# Patient Record
Sex: FEMALE | Race: BLACK OR AFRICAN AMERICAN | Employment: FULL TIME | URBAN - METROPOLITAN AREA
[De-identification: names, ages, dates, MRNs, and addresses within clinical notes are randomized per-mention and may not be internally consistent; named-entity substitution may affect disease eponyms.]

---

## 2017-02-20 ENCOUNTER — HOSPITAL ENCOUNTER (EMERGENCY)
Age: 32
Discharge: HOME | End: 2017-02-20
Attending: EMERGENCY MEDICINE
Payer: COMMERCIAL

## 2017-02-20 VITALS
BODY MASS INDEX: 22.26 KG/M2 | SYSTOLIC BLOOD PRESSURE: 111 MMHG | TEMPERATURE: 98.7 F | WEIGHT: 121 LBS | HEIGHT: 62 IN | RESPIRATION RATE: 16 BRPM | DIASTOLIC BLOOD PRESSURE: 74 MMHG | OXYGEN SATURATION: 98 % | HEART RATE: 87 BPM

## 2017-02-20 DIAGNOSIS — M79.621 AXILLARY PAIN, RIGHT: Primary | ICD-10-CM

## 2017-02-20 PROCEDURE — 93971 EXTREMITY STUDY: CPT

## 2017-02-20 PROCEDURE — 99281 EMR DPT VST MAYX REQ PHY/QHP: CPT

## 2017-02-20 NOTE — ED TRIAGE NOTES
TRIAGE NOTE: Pt arrives from [facility] who advised her to come to ED to rule out DVT to her right armpit. Pt reports noticing a bump on Wednesday and it has progressed to two bumps. Reports MD at [facility] was pretty sure that it is a superficial blood clot but wanted her to have an US to be sure.

## 2017-02-21 NOTE — DISCHARGE INSTRUCTIONS
We hope that we have addressed all of your medical concerns. The examination and treatment you received in the Emergency Department were for an emergent problem and were not intended as complete care. It is important that you follow up with your healthcare provider(s) for ongoing care. If your symptoms worsen or do not improve as expected, and you are unable to reach your usual health care provider(s), you should return to the Emergency Department. Today's healthcare is undergoing tremendous change, and patient satisfaction surveys are one of the many tools to assess the quality of medical care. You may receive a survey from the [employer] regarding your experience in the Emergency Department. I hope that your experience has been completely positive, particularly the medical care that I provided. As such, please participate in the survey; anything less than excellent does not meet my expectations or intentions. Thank you for allowing us to provide you with medical care today. We realize that you have many choices for your emergency care needs. Please choose us in the future for any continued health care needs. Raymundo Fay [facility], [address]: [phone]            No results found for this or any previous visit (from the past 24 hour(s)). No results found.

## 2017-02-21 NOTE — ED PROVIDER NOTES
HPI Comments: 28-year-old female presents to the emergency department for evaluation of pain under her right axilla. Patient noted a bump in the armpit on Thursday,emergency 4 days ago. She does not have any pain. She hheheas not had any drainage. She has not had fever or chills. Denies any injury or trauma. No chest pain or shortness of breath. No difficulty breathing. No medicines taken for this. No medicines taken prior to arrival. No aggravating factors. The patient was seen at urgent care and referred to the emergency room for further evaluation and ultrasound. Social hx  +smoker  Social alcohol    The history is provided by the patient. Past Medical History:   Diagnosis Date    Anxiety        Past Surgical History:   Procedure Laterality Date    Hx other surgical       leep surgery, ectopic pregnancy         History reviewed. No pertinent family history. Social History     Social History    Marital status: SINGLE     Spouse name: N/A    Number of children: N/A    Years of education: N/A     Occupational History    Not on file. Social History Main Topics    Smoking status: Current Every Day Smoker     Types: Cigarettes    Smokeless tobacco: Never Used      Comment: began smoking again    Alcohol use 0.0 oz/week     0 Standard drinks or equivalent per week      Comment: Social     Drug use: No    Sexual activity: Not on file     Other Topics Concern    Not on file     Social History Narrative         ALLERGIES: Biaxin [clarithromycin] and Pepto-bismol [bismuth subsalicylate]    Review of Systems   Constitutional: Negative for chills and fever. HENT: Negative for congestion and rhinorrhea. Respiratory: Negative for cough, chest tightness and shortness of breath. Cardiovascular: Negative for chest pain. Gastrointestinal: Negative for abdominal pain, nausea and vomiting. Musculoskeletal: Negative for back pain, neck pain and neck stiffness.    Skin: Negative for color change and rash. Neurological: Negative for dizziness, weakness, light-headedness and headaches. All other systems reviewed and are negative. Vitals:    02/20/17 1731   BP: 110/75   Pulse: 97   Resp: 17   Temp: 98.7 °F (37.1 °C)   SpO2: 97%   Weight: 54.9 kg (121 lb)   Height: 5' 2\" (1.575 m)            Physical Exam   Constitutional: She is oriented to person, place, and time. She appears well-developed and well-nourished. No distress. HENT:   Head: Normocephalic and atraumatic. Right Ear: External ear normal.   Left Ear: External ear normal.   Eyes: Conjunctivae and EOM are normal. Pupils are equal, round, and reactive to light. Neck: Normal range of motion. Neck supple. Cardiovascular: Normal rate and regular rhythm. Pulmonary/Chest: Effort normal and breath sounds normal. No respiratory distress. Musculoskeletal: Normal range of motion. Right axilla. No redness, warmth or swelling. Palpable cord to right axilla. No induration or fluctuance to suggest abscess. No pain with palpation. Neurological: She is alert and oriented to person, place, and time. She exhibits normal muscle tone. Coordination normal.   Skin: Skin is warm and dry. No erythema. Psychiatric: She has a normal mood and affect. Her behavior is normal. Judgment and thought content normal.   Nursing note and vitals reviewed. MDM  Number of Diagnoses or Management Options  Axillary pain, right:   Diagnosis management comments: 61-year-old female presenting to the emergency room for right. Present for 4 days. No pain with palpation. No induration or fluctuance to suggest abscess. There is a palpable cord. Nontender to palpation  Plan: Vascular study    1:02 AM  Study negative for dvt. No signs of abscess or infection at this time. No fever. Will discharge home with warm compresses and pcp followup. Patient's results have been reviewed with them.   Patient and/or family have verbally conveyed their understanding and agreement of the patient's signs, symptoms, diagnosis, treatment and prognosis and additionally agree to follow up as recommended or return to the Emergency Room should their condition change prior to follow-up. Discharge instructions have also been provided to the patient with some educational information regarding their diagnosis as well a list of reasons why they would want to return to the ER prior to their follow-up appointment should their condition change. Amount and/or Complexity of Data Reviewed  Discuss the patient with other providers: yes (ER attending-Alex)    Patient Progress  Patient progress: stable    ED Course       Procedures    Pt case including HPI, PE, and all available lab and radiology results has been discussed with attending physician. Opportunity to evaluate patient has been provided to ER attending. Discharge and prescription plan has been agreed upon.

## 2017-02-21 NOTE — PROCEDURES
[facility]  *** FINAL REPORT ***    Name: David Heredia  MRN: [id number]  : 1985  HIS Order #: [id number]  [id number] [facility] Visit #: [id number]  Date: 2017    TYPE OF TEST: Peripheral Venous Testing    REASON FOR TEST  Pain in limb    Right Arm:-  Deep venous thrombosis:           No  Superficial venous thrombosis:    No      INTERPRETATION/FINDINGS  PROCEDURE:  Color duplex ultrasound imaging of upper extremity veins. FINDINGS:       Right:  The internal jugular, subclavian, axillary, brachial,  basilic, and cephalic veins are patent and without evidence of  thrombus where visualized;  each is compressible and there is no  narrowing of the flow channel on color Doppler imaging. Phasic/pulsatile flow is observed in the subclavian vein. Left:    The subclavian vein is patent and without evidence of  thrombus. Phasic/pulsatile flow is observed. This side was not  otherwise evaluated. IMPRESSION:  No evidence of right upper extremity vein thrombosis. ADDITIONAL COMMENTS    I have personally reviewed the data relevant to the interpretation of  this  study.     TECHNOLOGIST: CASH West, LORRIE  Signed: 2017 08:04 PM    PHYSICIAN: Wing Yeager., MD  Signed: 2017 07:52 AM

## 2017-03-28 ENCOUNTER — TELEPHONE (OUTPATIENT)
Dept: FAMILY MEDICINE CLINIC | Age: 32
End: 2017-03-28

## 2017-03-28 DIAGNOSIS — K21.9 GERD WITHOUT ESOPHAGITIS: Primary | ICD-10-CM

## 2017-03-28 NOTE — TELEPHONE ENCOUNTER
Patient informed me that she has been experiencing acid coming up in throat and sensation of choking. Patient states \" I have ceased smoking, changed my diet, taken Tums with no relief and have sensation of burping but unable to. \"  Patient inquiring for referral to gastro.

## 2017-03-28 NOTE — TELEPHONE ENCOUNTER
Patient called and said she was still having issues with her throat. She would like to know if Dr. Karyl Spurling would refer her to a specialist. She said she would like to see someone maybe for acid reflux etc. Please advise, her contact number is [phone].  Thanks

## 2017-03-29 NOTE — TELEPHONE ENCOUNTER
Patient was given name of gastro doctor Dr. Frank Ramirez and office number, informed to contact our office with appointment date and time for insurance approval .    Patient was sent information of move and informed to contact [facility] after March 31, 2017. Patient verbalized understanding.

## 2017-04-05 ENCOUNTER — TELEPHONE (OUTPATIENT)
Dept: FAMILY MEDICINE CLINIC | Age: 32
End: 2017-04-05

## 2017-04-05 DIAGNOSIS — K21.9 GERD WITHOUT ESOPHAGITIS: Primary | ICD-10-CM

## 2017-04-05 DIAGNOSIS — R07.0 THROAT PAIN: ICD-10-CM

## 2017-04-05 NOTE — TELEPHONE ENCOUNTER
Ben Kristen  [phone]    Patient is requesting a return call from Dr. Shala Mansfield. She stated that Dr. Shala Mansfield will know what it is regarding.

## 2017-04-05 NOTE — TELEPHONE ENCOUNTER
Pt returned call, states she is aware of appointment scheduled with Dr Marisela Lawson however she now wants a new referral to see an ENT. Pt states she has been experiencing sore throat for a long time and was told it was related to stress. Pt states that now believes sore throat is related to acid reflux and believe \"more is going on\".  She c/o feeling a \"sore, tingling sensation in her throat\" and wants further eval for a specialist.

## 2018-04-26 ENCOUNTER — HOSPITAL ENCOUNTER (OUTPATIENT)
Dept: ULTRASOUND IMAGING | Age: 33
Discharge: HOME | End: 2018-04-26
Attending: OTOLARYNGOLOGY
Payer: COMMERCIAL

## 2018-04-26 DIAGNOSIS — D34 BENIGN NEOPLASM OF THYROID GLAND: ICD-10-CM

## 2018-04-26 PROCEDURE — 76536 US EXAM OF HEAD AND NECK: CPT

## 2018-06-09 ENCOUNTER — HOSPITAL ENCOUNTER (EMERGENCY)
Age: 33
Discharge: HOME | End: 2018-06-09
Attending: EMERGENCY MEDICINE
Payer: COMMERCIAL

## 2018-06-09 VITALS
HEART RATE: 77 BPM | OXYGEN SATURATION: 100 % | HEIGHT: 62 IN | TEMPERATURE: 98.5 F | SYSTOLIC BLOOD PRESSURE: 114 MMHG | WEIGHT: 125 LBS | RESPIRATION RATE: 16 BRPM | DIASTOLIC BLOOD PRESSURE: 75 MMHG | BODY MASS INDEX: 23 KG/M2

## 2018-06-09 DIAGNOSIS — R10.2 PELVIC PAIN IN FEMALE: Primary | ICD-10-CM

## 2018-06-09 LAB
APPEARANCE UR: CLEAR
BACTERIA URNS QL MICRO: NEGATIVE /HPF
BILIRUB UR QL: NEGATIVE
COLOR UR: ABNORMAL
EPITH CASTS URNS QL MICRO: ABNORMAL /LPF
GLUCOSE UR STRIP.AUTO-MCNC: NEGATIVE MG/DL
HCG UR QL: NEGATIVE
HGB UR QL STRIP: ABNORMAL
HYALINE CASTS URNS QL MICRO: ABNORMAL /LPF (ref 0–5)
KETONES UR QL STRIP.AUTO: NEGATIVE MG/DL
LEUKOCYTE ESTERASE UR QL STRIP.AUTO: NEGATIVE
NITRITE UR QL STRIP.AUTO: NEGATIVE
PH UR STRIP: 6 [PH] (ref 5–8)
PROT UR STRIP-MCNC: NEGATIVE MG/DL
RBC #/AREA URNS HPF: ABNORMAL /HPF (ref 0–5)
SP GR UR REFRACTOMETRY: 1.03 (ref 1–1.03)
UR CULT HOLD, URHOLD: NORMAL
UROBILINOGEN UR QL STRIP.AUTO: 1 EU/DL (ref 0.2–1)
WBC URNS QL MICRO: ABNORMAL /HPF (ref 0–4)

## 2018-06-09 PROCEDURE — 99283 EMERGENCY DEPT VISIT LOW MDM: CPT

## 2018-06-09 PROCEDURE — 81025 URINE PREGNANCY TEST: CPT

## 2018-06-09 PROCEDURE — 81001 URINALYSIS AUTO W/SCOPE: CPT | Performed by: PHYSICIAN ASSISTANT

## 2018-06-09 NOTE — ED PROVIDER NOTES
HPI Comments: 34 y.o. female with past medical history significant for anxiety who presents from home with chief complaint of abdominal pain. Pt reports bilateral lower abdominal pain for the last 5 days which gradually localized into her LLQ accompanied by occasional dizziness. She denies current pain. Pt states she is concerned because she is [place] risk for ectopic pregnancy\"; she has had negative pregnancy tests w/ an ectopic pregnancy in the past. Pt notes she does not have her R fallopian tube. Her last MP began on 5/20/18 \"a couple days early\" and lasted for 4 days w/ \"light\" flow. Pt states she has had unprotected sex since then. Pt denies vaginal discharge, vaginal bleeding, and urinary sx. There are no other acute medical concerns at this time. PCP: Ever Serrano MD    Note written by Keyla Duran, as dictated by Delfina Almeida MD 5:58 PM    The history is provided by the patient. Past Medical History:   Diagnosis Date    Anxiety        Past Surgical History:   Procedure Laterality Date    HX OTHER SURGICAL      leep surgery, ectopic pregnancy         History reviewed. No pertinent family history. Social History     Social History    Marital status: SINGLE     Spouse name: N/A    Number of children: N/A    Years of education: N/A     Occupational History    Not on file. Social History Main Topics    Smoking status: Current Every Day Smoker     Types: Cigarettes    Smokeless tobacco: Never Used      Comment: began smoking again    Alcohol use 0.0 oz/week     0 Standard drinks or equivalent per week      Comment: Social     Drug use: No    Sexual activity: Not on file     Other Topics Concern    Not on file     Social History Narrative         ALLERGIES: Biaxin [clarithromycin] and Pepto-bismol [bismuth subsalicylate]    Review of Systems   Gastrointestinal: Positive for abdominal pain.    Genitourinary: Negative for dysuria, frequency, hematuria, vaginal bleeding and vaginal discharge. Neurological: Positive for dizziness. All other systems reviewed and are negative. Vitals:    06/09/18 1645   BP: 123/87   Pulse: (!) 103   Resp: 16   Temp: 98.7 °F (37.1 °C)   SpO2: 99%   Weight: 56.7 kg (125 lb)   Height: 5' 2\" (1.575 m)            Physical Exam   Constitutional: She appears well-developed and well-nourished. No distress. HENT:   Head: Normocephalic and atraumatic. Eyes: Conjunctivae are normal.   Neck: Neck supple. Cardiovascular: Normal rate, regular rhythm and normal heart sounds. Pulmonary/Chest: Effort normal and breath sounds normal. No stridor. No respiratory distress. Abdominal: She exhibits no distension. There is tenderness (LLQ, mild). Musculoskeletal: Normal range of motion. She exhibits no edema. Neurological: She is alert. Coordination normal.   Skin: Skin is warm and dry. Psychiatric: She has a normal mood and affect. Note written by Keyla Negro, as dictated by Gisella Brink MD 5:58 PM    MDM    61-year-old female with history of ectopic pregnancy presents with left lower pelvic pain starting over the last few days. She is concerned this could be an early ectopic pregnancy. We discussed her recent menstrual cycles and how it would be too early to detect a pregnancy given that she may have just ovulated. She is well-appearing, has a negative pregnancy test here, and there is no further workup indicated from the emergency department. Routine OB/GYN followup.     ED Course       Procedures

## 2018-06-09 NOTE — ED NOTES
4:42 PM  I have evaluated the patient as the Provider in Triage. I have reviewed Her vital signs and the triage nurse assessment. I have talked with the patient and any available family and advised that I am the provider in triage and have ordered the appropriate study to initiate their work up based on the clinical presentation during my assessment. I have advised that the patient will be accommodated in the Main ED as soon as possible. I have also requested to contact the triage nurse or myself immediately if the patient experiences any changes in their condition during this brief waiting period. Pelvic cramping. Hx ectopic. Took 2 tests at home - 1 faintly positive, one negative. Denies bleeding or discharge. No N/V/D - \"kind of constipated\". LMP May 20th.       AIDA Hannah

## 2018-06-09 NOTE — ED TRIAGE NOTES
TRIAGE NOTE: Patient reports 5 days of lower quadrant cramping and today pain seems to have moved to left. Denies vaginal discharge/bleeding. Denies urinary symptoms. Patient reports taking 2 pregnancy tests, unsure if 1st was positive. Hx of ectopic pregnancy.

## 2018-06-09 NOTE — ED NOTES
MD reviewed discharge instructions and options with patient and patient verbalized understanding. RN reviewed discharge instructions using teachback method. Pt ambulated to exit without difficulty and in no signs of acute distress escorted by self who will drive home. No complaints or needs expressed at this time. VSS at time of discharge. Pt to call PCP in the morning for follow up.

## 2018-06-09 NOTE — DISCHARGE INSTRUCTIONS
Pelvic Pain: Care Instructions  Your Care Instructions    Pelvic pain, or pain in the lower belly, can have many causes. Often pelvic pain is not serious and gets better in a few days. If your pain continues or gets worse, you may need tests and treatment. Tell your doctor about any new symptoms. These may be signs of a serious problem. Follow-up care is a key part of your treatment and safety. Be sure to make and go to all appointments, and call your doctor if you are having problems. It's also a good idea to know your test results and keep a list of the medicines you take. How can you care for yourself at home? · Rest until you feel better. Lie down, and raise your legs by placing a pillow under your knees. · Drink plenty of fluids. You may find that small, frequent sips are easier on your stomach than if you drink a lot at once. Avoid drinks with carbonation or caffeine, such as soda pop, tea, or coffee. · Try eating several small meals instead of 2 or 3 large ones. Eat mild foods, such as rice, dry toast or crackers, bananas, and applesauce. Avoid fatty and spicy foods, other fruits, and alcohol until 48 hours after your symptoms have gone away. · Take an over-the-counter pain medicine, such as acetaminophen (Tylenol), ibuprofen (Advil, Motrin), or naproxen (Aleve). Read and follow all instructions on the label. · Do not take two or more pain medicines at the same time unless the doctor told you to. Many pain medicines have acetaminophen, which is Tylenol. Too much acetaminophen (Tylenol) can be harmful. · You can put a heating pad, a warm cloth, or moist heat on your belly to relieve pain. When should you call for help? Call your doctor now or seek immediate medical care if:  · You have a new or higher fever. · You have unusual vaginal bleeding. · You have new or worse belly or pelvic pain. · You have vaginal discharge that has increased in amount or smells bad.   Watch closely for changes in your health, and be sure to contact your doctor if:  · You do not get better as expected. Where can you learn more? Go to http://juan josé-gay.info/. Enter 296-796-085 in the search box to learn more about \"Pelvic Pain: Care Instructions. \"  Current as of: October 13, 2016  Content Version: 11.4  © 4673-2105 [employer]. Care instructions adapted under license by [employer] (which disclaims liability or warranty for this information). If you have questions about a medical condition or this instruction, always ask your healthcare professional. [place] [address] any warranty or liability for your use of this information.

## 2019-01-31 ENCOUNTER — OFFICE VISIT (OUTPATIENT)
Dept: FAMILY MEDICINE CLINIC | Age: 34
End: 2019-01-31

## 2019-01-31 VITALS
RESPIRATION RATE: 20 BRPM | HEART RATE: 86 BPM | SYSTOLIC BLOOD PRESSURE: 116 MMHG | BODY MASS INDEX: 23.55 KG/M2 | HEIGHT: 62 IN | OXYGEN SATURATION: 98 % | WEIGHT: 128 LBS | TEMPERATURE: 98.6 F | DIASTOLIC BLOOD PRESSURE: 71 MMHG

## 2019-01-31 DIAGNOSIS — R73.03 BORDERLINE DIABETES MELLITUS: ICD-10-CM

## 2019-01-31 DIAGNOSIS — R20.0 NUMBNESS AND TINGLING: ICD-10-CM

## 2019-01-31 DIAGNOSIS — E55.9 VITAMIN D DEFICIENCY: ICD-10-CM

## 2019-01-31 DIAGNOSIS — R35.0 FREQUENCY OF URINATION: ICD-10-CM

## 2019-01-31 DIAGNOSIS — Z34.90 PREGNANCY, UNSPECIFIED GESTATIONAL AGE: Primary | ICD-10-CM

## 2019-01-31 DIAGNOSIS — R20.2 NUMBNESS AND TINGLING: ICD-10-CM

## 2019-01-31 DIAGNOSIS — Z13.29 SCREENING FOR THYROID DISORDER: ICD-10-CM

## 2019-01-31 DIAGNOSIS — Z13.220 SCREENING CHOLESTEROL LEVEL: ICD-10-CM

## 2019-01-31 DIAGNOSIS — Z00.00 ENCOUNTER FOR ANNUAL PHYSICAL EXAM: ICD-10-CM

## 2019-01-31 LAB
BILIRUB UR QL STRIP: NEGATIVE
GLUCOSE UR-MCNC: NEGATIVE MG/DL
HCG URINE, QL. (POC): NEGATIVE
KETONES P FAST UR STRIP-MCNC: NEGATIVE MG/DL
PH UR STRIP: 7.5 [PH] (ref 4.6–8)
PROT UR QL STRIP: NEGATIVE
SP GR UR STRIP: 1.02 (ref 1–1.03)
UA UROBILINOGEN AMB POC: NORMAL (ref 0.2–1)
URINALYSIS CLARITY POC: CLEAR
URINALYSIS COLOR POC: YELLOW
URINE BLOOD POC: NORMAL
URINE LEUKOCYTES POC: NEGATIVE
URINE NITRITES POC: NEGATIVE
VALID INTERNAL CONTROL?: YES

## 2019-01-31 RX ORDER — NORETHINDRONE ACETATE AND ETHINYL ESTRADIOL 1.5; 3 MG/1; UG/1
TABLET ORAL
Refills: 0 | COMMUNITY
Start: 2019-01-25 | End: 2020-06-26

## 2019-01-31 NOTE — PATIENT INSTRUCTIONS

## 2019-01-31 NOTE — PROGRESS NOTES
Chief Complaint   Patient presents with    New Patient    Tingling     lower legs,left wrist and arm     Dizziness     not sleeping     Subjective:  Frankie Mukherjee is a 34 y.o. female for annual medical exam.   Her gyne and breast care is done elsewhere by her Ob-Gyne physician. Current Outpatient Medications   Medication Sig Dispense Refill    JUNEL 1.5/30, 21, 1.5-30 mg-mcg tab TAKE 1 TABLET BY MOUTH DAILY  0     Allergies   Allergen Reactions    Biaxin [Clarithromycin] Unknown (comments)    Pepto-Bismol [Bismuth Subsalicylate] Nausea and Vomiting     Past Medical History:   Diagnosis Date    Anxiety      Past Surgical History:   Procedure Laterality Date    HX LEEP PROCEDURE      HX OTHER SURGICAL      leep surgery, ectopic pregnancy     History reviewed. No pertinent family history. Social History     Tobacco Use    Smoking status: Current Every Day Smoker     Types: Cigarettes    Smokeless tobacco: Never Used    Tobacco comment: began smoking again   Substance Use Topics    Alcohol use:  Yes     Alcohol/week: 0.0 oz     Comment: Social         Lab Results   Component Value Date/Time    WBC 8.2 03/20/2015 04:35 PM    HGB 11.0 (L) 03/20/2015 04:35 PM    HCT 35.1 03/20/2015 04:35 PM    PLATELET 542 07/27/1993 04:35 PM    MCV 91 03/20/2015 04:35 PM     Lab Results   Component Value Date/Time    Cholesterol, total 167 11/14/2014 09:51 AM    HDL Cholesterol 43 11/14/2014 09:51 AM    LDL, calculated 99 11/14/2014 09:51 AM    Triglyceride 124 11/14/2014 09:51 AM     Lab Results   Component Value Date/Time    TSH 1.190 04/08/2016 10:05 AM    T4, Free 1.15 04/08/2016 10:05 AM      Lab Results   Component Value Date/Time    Sodium 138 11/14/2014 09:51 AM    Potassium 4.4 11/14/2014 09:51 AM    Chloride 98 11/14/2014 09:51 AM    CO2 22 11/14/2014 09:51 AM    Glucose 79 11/14/2014 09:51 AM    BUN 9 11/14/2014 09:51 AM    Creatinine 0.67 11/14/2014 09:51 AM    BUN/Creatinine ratio 13 11/14/2014 09:51 AM GFR est  11/14/2014 09:51 AM    GFR est non- 11/14/2014 09:51 AM    Calcium 9.7 11/14/2014 09:51 AM    Bilirubin, total 0.4 11/14/2014 09:51 AM    ALT (SGPT) 10 11/14/2014 09:51 AM    AST (SGOT) 18 11/14/2014 09:51 AM    Alk. phosphatase 48 11/14/2014 09:51 AM    Protein, total 7.8 11/14/2014 09:51 AM    Albumin 4.6 11/14/2014 09:51 AM    A-G Ratio 1.4 11/14/2014 09:51 AM        ROS:   Feeling generally well  No unusual headaches, no dysphagia  No prolonged cough, No dyspnea or chest pain on exertion  No abdominal pain, change in bowel habits, black or bloody stools  No urinary tract symptoms  No new or unusual musculoskeletal symptoms. Specific concerns today:     Objective:  Blood pressure 116/71, pulse 86, temperature 98.6 °F (37 °C), temperature source Oral, resp. rate 20, height 5' 2\" (1.575 m), weight 128 lb (58.1 kg), last menstrual period 01/18/2019, SpO2 98 %. Patient appears well, alert, oriented x 3, in no distress. ENT: Neck supple. No adenopathy or thyromegaly. PERRL  Lungs: clear, good air entry, no wheezes, rhonchi or rales  CVS:  no murmurs, regular rate and rhythm  Abdomen soft without tenderness or organomegaly  Ext: no edema, normal peripheral pulses  Neurological is normal, no focal findings. Breast and Pelvic exams are deferred.     Assessment/Plan:  Diagnoses and all orders for this visit:    Pregnancy, unspecified gestational age  -     AMB POC URINE PREGNANCY TEST, VISUAL COLOR COMPARISON    Frequency of urination  -     AMB POC URINALYSIS DIP STICK AUTO W/O MICRO    Encounter for annual physical exam  -     METABOLIC PANEL, COMPREHENSIVE  -     CBC WITH AUTOMATED DIFF    Vitamin D deficiency  -     VITAMIN D, 25 HYDROXY    Screening for thyroid disorder  -     TSH 3RD GENERATION    Screening cholesterol level  -     LIPID PANEL    Borderline diabetes mellitus  -     HEMOGLOBIN A1C WITH EAG    Numbness and tingling  -     VITAMIN B12 & FOLATE      Patient Instructions Well Visit, Ages 25 to 48: Care Instructions  Your Care Instructions    Physical exams can help you stay healthy. Your doctor has checked your overall health and may have suggested ways to take good care of yourself. He or she also may have recommended tests. At home, you can help prevent illness with healthy eating, regular exercise, and other steps. Follow-up care is a key part of your treatment and safety. Be sure to make and go to all appointments, and call your doctor if you are having problems. It's also a good idea to know your test results and keep a list of the medicines you take. How can you care for yourself at home? · Reach and stay at a healthy weight. This will lower your risk for many problems, such as obesity, diabetes, heart disease, and high blood pressure. · Get at least 30 minutes of physical activity on most days of the week. Walking is a good choice. You also may want to do other activities, such as running, swimming, cycling, or playing tennis or team sports. Discuss any changes in your exercise program with your doctor. · Do not smoke or allow others to smoke around you. If you need help quitting, talk to your doctor about stop-smoking programs and medicines. These can increase your chances of quitting for good. · Talk to your doctor about whether you have any risk factors for sexually transmitted infections (STIs). Having one sex partner (who does not have STIs and does not have sex with anyone else) is a good way to avoid these infections. · Use birth control if you do not want to have children at this time. Talk with your doctor about the choices available and what might be best for you. · Protect your skin from too much sun. When you're outdoors from 10 a.m. to 4 p.m., stay in the shade or cover up with clothing and a hat with a wide brim. Wear sunglasses that block UV rays. Even when it's cloudy, put broad-spectrum sunscreen (SPF 30 or higher) on any exposed skin.   · See a dentist one or two times a year for checkups and to have your teeth cleaned. · Wear a seat belt in the car. · Drink alcohol in moderation, if at all. That means no more than 2 drinks a day for men and 1 drink a day for women. Follow your doctor's advice about when to have certain tests. These tests can spot problems early. For everyone  · Cholesterol. Have the fat (cholesterol) in your blood tested after age 21. Your doctor will tell you how often to have this done based on your age, family history, or other things that can increase your risk for heart disease. · Blood pressure. Have your blood pressure checked during a routine doctor visit. Your doctor will tell you how often to check your blood pressure based on your age, your blood pressure results, and other factors. · Vision. Talk with your doctor about how often to have a glaucoma test.  · Diabetes. Ask your doctor whether you should have tests for diabetes. · Colon cancer. Have a test for colon cancer at age 48. You may have one of several tests. If you are younger than 48, you may need a test earlier if you have any risk factors. Risk factors include whether you already had a precancerous polyp removed from your colon or whether your parent, brother, sister, or child has had colon cancer. For women  · Breast exam and mammogram. Talk to your doctor about when you should have a clinical breast exam and a mammogram. Medical experts differ on whether and how often women under 50 should have these tests. Your doctor can help you decide what is right for you. · Pap test and pelvic exam. Begin Pap tests at age 24. A Pap test is the best way to find cervical cancer. The test often is part of a pelvic exam. Ask how often to have this test.  · Tests for sexually transmitted infections (STIs). Ask whether you should have tests for STIs. You may be at risk if you have sex with more than one person, especially if your partners do not wear condoms.   For men  · Tests for sexually transmitted infections (STIs). Ask whether you should have tests for STIs. You may be at risk if you have sex with more than one person, especially if you do not wear a condom. · Testicular cancer exam. Ask your doctor whether you should check your testicles regularly. · Prostate exam. Talk to your doctor about whether you should have a blood test (called a PSA test) for prostate cancer. Experts differ on whether and when men should have this test. Some experts suggest it if you are older than 39 and are -American or have a father or brother who got prostate cancer when he was younger than 72. When should you call for help? Watch closely for changes in your health, and be sure to contact your doctor if you have any problems or symptoms that concern you. Where can you learn more? Go to http://juan josé-gay.info/. Enter P072 in the search box to learn more about \"Well Visit, Ages 25 to 48: Care Instructions. \"  Current as of: March 28, 2018  Content Version: 11.9  © 4578-3316 [employer]. Care instructions adapted under license by [employer] (which disclaims liability or warranty for this information). If you have questions about a medical condition or this instruction, always ask your healthcare professional. [place] [address] any warranty or liability for your use of this information. Follow-up Disposition:  Return 1-2 weeks, for routine follow up.

## 2019-02-02 LAB
25(OH)D3+25(OH)D2 SERPL-MCNC: 10.6 NG/ML (ref 30–100)
ALBUMIN SERPL-MCNC: 4.5 G/DL (ref 3.5–5.5)
ALBUMIN/GLOB SERPL: 1.6 {RATIO} (ref 1.2–2.2)
ALP SERPL-CCNC: 45 IU/L (ref 39–117)
ALT SERPL-CCNC: 10 IU/L (ref 0–32)
AST SERPL-CCNC: 17 IU/L (ref 0–40)
BASOPHILS # BLD AUTO: 0 X10E3/UL (ref 0–0.2)
BASOPHILS NFR BLD AUTO: 0 %
BILIRUB SERPL-MCNC: 0.7 MG/DL (ref 0–1.2)
BUN SERPL-MCNC: 9 MG/DL (ref 6–20)
BUN/CREAT SERPL: 12 (ref 9–23)
CALCIUM SERPL-MCNC: 9.5 MG/DL (ref 8.7–10.2)
CHLORIDE SERPL-SCNC: 102 MMOL/L (ref 96–106)
CHOLEST SERPL-MCNC: 145 MG/DL (ref 100–199)
CO2 SERPL-SCNC: 25 MMOL/L (ref 20–29)
CREAT SERPL-MCNC: 0.74 MG/DL (ref 0.57–1)
EOSINOPHIL # BLD AUTO: 0.2 X10E3/UL (ref 0–0.4)
EOSINOPHIL NFR BLD AUTO: 2 %
ERYTHROCYTE [DISTWIDTH] IN BLOOD BY AUTOMATED COUNT: 12.9 % (ref 12.3–15.4)
EST. AVERAGE GLUCOSE BLD GHB EST-MCNC: 91 MG/DL
FOLATE SERPL-MCNC: 14.8 NG/ML
GLOBULIN SER CALC-MCNC: 2.9 G/DL (ref 1.5–4.5)
GLUCOSE SERPL-MCNC: 87 MG/DL (ref 65–99)
HBA1C MFR BLD: 4.8 % (ref 4.8–5.6)
HCT VFR BLD AUTO: 37.2 % (ref 34–46.6)
HDLC SERPL-MCNC: 43 MG/DL
HGB BLD-MCNC: 11.7 G/DL (ref 11.1–15.9)
IMM GRANULOCYTES # BLD AUTO: 0 X10E3/UL (ref 0–0.1)
IMM GRANULOCYTES NFR BLD AUTO: 0 %
LDLC SERPL CALC-MCNC: 85 MG/DL (ref 0–99)
LYMPHOCYTES # BLD AUTO: 3.1 X10E3/UL (ref 0.7–3.1)
LYMPHOCYTES NFR BLD AUTO: 40 %
MCH RBC QN AUTO: 29 PG (ref 26.6–33)
MCHC RBC AUTO-ENTMCNC: 31.5 G/DL (ref 31.5–35.7)
MCV RBC AUTO: 92 FL (ref 79–97)
MONOCYTES # BLD AUTO: 0.6 X10E3/UL (ref 0.1–0.9)
MONOCYTES NFR BLD AUTO: 8 %
NEUTROPHILS # BLD AUTO: 3.8 X10E3/UL (ref 1.4–7)
NEUTROPHILS NFR BLD AUTO: 50 %
PLATELET # BLD AUTO: 251 X10E3/UL (ref 150–379)
POTASSIUM SERPL-SCNC: 4.3 MMOL/L (ref 3.5–5.2)
PROT SERPL-MCNC: 7.4 G/DL (ref 6–8.5)
RBC # BLD AUTO: 4.04 X10E6/UL (ref 3.77–5.28)
SODIUM SERPL-SCNC: 137 MMOL/L (ref 134–144)
TRIGL SERPL-MCNC: 86 MG/DL (ref 0–149)
TSH SERPL DL<=0.005 MIU/L-ACNC: 1.51 UIU/ML (ref 0.45–4.5)
VIT B12 SERPL-MCNC: 256 PG/ML (ref 232–1245)
VLDLC SERPL CALC-MCNC: 17 MG/DL (ref 5–40)
WBC # BLD AUTO: 7.8 X10E3/UL (ref 3.4–10.8)

## 2019-02-07 ENCOUNTER — OFFICE VISIT (OUTPATIENT)
Dept: FAMILY MEDICINE CLINIC | Age: 34
End: 2019-02-07

## 2019-02-07 VITALS — HEIGHT: 62 IN | BODY MASS INDEX: 23.55 KG/M2 | WEIGHT: 128 LBS

## 2019-02-07 DIAGNOSIS — E55.9 VITAMIN D DEFICIENCY: ICD-10-CM

## 2019-02-07 DIAGNOSIS — E55.9 VITAMIN D DEFICIENCY: Primary | ICD-10-CM

## 2019-02-07 RX ORDER — CHOLECALCIFEROL TAB 125 MCG (5000 UNIT) 125 MCG
5000 TAB ORAL DAILY
Qty: 90 TAB | Refills: 1 | Status: SHIPPED | OUTPATIENT
Start: 2019-02-07 | End: 2019-02-07 | Stop reason: SDUPTHER

## 2019-02-07 RX ORDER — CHOLECALCIFEROL TAB 125 MCG (5000 UNIT) 125 MCG
5000 TAB ORAL DAILY
Qty: 90 TAB | Refills: 1 | Status: SHIPPED | OUTPATIENT
Start: 2019-02-07 | End: 2020-06-26 | Stop reason: SDUPTHER

## 2019-02-07 RX ORDER — LANOLIN ALCOHOL/MO/W.PET/CERES
500 CREAM (GRAM) TOPICAL DAILY
Qty: 30 TAB | Refills: 0 | Status: SHIPPED | OUTPATIENT
Start: 2019-02-07 | End: 2021-10-22 | Stop reason: SDUPTHER

## 2019-02-07 RX ORDER — FOLIC ACID 1 MG/1
1 TABLET ORAL DAILY
Qty: 30 TAB | Refills: 3 | Status: SHIPPED | OUTPATIENT
Start: 2019-02-07 | End: 2021-10-22 | Stop reason: ALTCHOICE

## 2019-02-07 NOTE — PROGRESS NOTES
Chief Complaint Patient presents with  Results 1 week HPI: 
Ginny Baca is a 34 y.o. AA female presents for f/u on results. . 
Except for very low vit D level, blood work is normal 
Results and management have been discussed, diet and exercise encouraged. Review of Systems As per hpi Past Medical History:  
Diagnosis Date  Anxiety Past Surgical History:  
Procedure Laterality Date  HX LEEP PROCEDURE    
 HX OTHER SURGICAL    
 leep surgery, ectopic pregnancy Social History Socioeconomic History  Marital status: SINGLE Spouse name: Not on file  Number of children: Not on file  Years of education: Not on file  Highest education level: Not on file Tobacco Use  Smoking status: Current Every Day Smoker Types: Cigarettes  Smokeless tobacco: Never Used  Tobacco comment: began smoking again Substance and Sexual Activity  Alcohol use: Yes Alcohol/week: 0.0 oz  
  Comment: Social   
 Drug use: No  
 Sexual activity: Yes  
  Partners: Male Birth control/protection: Pill No family history on file. Current Outpatient Medications Medication Sig Dispense Refill  cholecalciferol, VITAMIN D3, (VITAMIN D3) 5,000 unit tab tablet Take 1 Tab by mouth daily. 90 Tab 1  JUNEL 1.5/30, 21, 1.5-30 mg-mcg tab TAKE 1 TABLET BY MOUTH DAILY  0 Allergies Allergen Reactions  Biaxin [Clarithromycin] Unknown (comments)  Pepto-Bismol [Bismuth Subsalicylate] Nausea and Vomiting Objective: 
Visit Vitals Ht 5' 2\" (1.575 m) Wt 128 lb (58.1 kg) LMP 01/18/2019 BMI 23.41 kg/m² Physical Exam:  
General appearance - alert, well appearing in no distress Mental status - alert, oriented to person, place, and time EYE-PERRL, EOMI Neck - supple, no significant adenopathy Chest - clear to auscultation, no wheezes, rales or rhonchi Heart - normal rate, regular rhythm, normal blood pressure Abdomen - soft, nontender, nondistended, no organomegaly Ext-peripheral pulses normal, no pedal edema Neuro -alert, oriented, normal speech, no focal findings Results for orders placed or performed in visit on 01/31/19 METABOLIC PANEL, COMPREHENSIVE Result Value Ref Range Glucose 87 65 - 99 mg/dL BUN 9 6 - 20 mg/dL Creatinine 0.74 0.57 - 1.00 mg/dL GFR est non- >59 mL/min/1.73 GFR est  >59 mL/min/1.73  
 BUN/Creatinine ratio 12 9 - 23 Sodium 137 134 - 144 mmol/L Potassium 4.3 3.5 - 5.2 mmol/L Chloride 102 96 - 106 mmol/L  
 CO2 25 20 - 29 mmol/L Calcium 9.5 8.7 - 10.2 mg/dL Protein, total 7.4 6.0 - 8.5 g/dL Albumin 4.5 3.5 - 5.5 g/dL GLOBULIN, TOTAL 2.9 1.5 - 4.5 g/dL A-G Ratio 1.6 1.2 - 2.2 Bilirubin, total 0.7 0.0 - 1.2 mg/dL Alk. phosphatase 45 39 - 117 IU/L  
 AST (SGOT) 17 0 - 40 IU/L  
 ALT (SGPT) 10 0 - 32 IU/L  
CBC WITH AUTOMATED DIFF Result Value Ref Range WBC 7.8 3.4 - 10.8 x10E3/uL  
 RBC 4.04 3.77 - 5.28 x10E6/uL HGB 11.7 11.1 - 15.9 g/dL HCT 37.2 34.0 - 46.6 % MCV 92 79 - 97 fL  
 MCH 29.0 26.6 - 33.0 pg  
 MCHC 31.5 31.5 - 35.7 g/dL  
 RDW 12.9 12.3 - 15.4 % PLATELET 952 233 - 393 x10E3/uL NEUTROPHILS 50 Not Estab. % Lymphocytes 40 Not Estab. % MONOCYTES 8 Not Estab. % EOSINOPHILS 2 Not Estab. % BASOPHILS 0 Not Estab. %  
 ABS. NEUTROPHILS 3.8 1.4 - 7.0 x10E3/uL Abs Lymphocytes 3.1 0.7 - 3.1 x10E3/uL  
 ABS. MONOCYTES 0.6 0.1 - 0.9 x10E3/uL  
 ABS. EOSINOPHILS 0.2 0.0 - 0.4 x10E3/uL  
 ABS. BASOPHILS 0.0 0.0 - 0.2 x10E3/uL IMMATURE GRANULOCYTES 0 Not Estab. %  
 ABS. IMM. GRANS. 0.0 0.0 - 0.1 x10E3/uL LIPID PANEL Result Value Ref Range Cholesterol, total 145 100 - 199 mg/dL Triglyceride 86 0 - 149 mg/dL HDL Cholesterol 43 >39 mg/dL VLDL, calculated 17 5 - 40 mg/dL LDL, calculated 85 0 - 99 mg/dL HEMOGLOBIN A1C WITH EAG Result Value Ref Range Hemoglobin A1c 4.8 4.8 - 5.6 % Estimated average glucose 91 mg/dL TSH 3RD GENERATION Result Value Ref Range TSH 1.510 0.450 - 4.500 uIU/mL VITAMIN D, 25 HYDROXY Result Value Ref Range VITAMIN D, 25-HYDROXY 10.6 (L) 30.0 - 100.0 ng/mL VITAMIN B12 & FOLATE Result Value Ref Range Vitamin B12 256 232 - 1,245 pg/mL Folate 14.8 >3.0 ng/mL AMB POC URINE PREGNANCY TEST, VISUAL COLOR COMPARISON Result Value Ref Range VALID INTERNAL CONTROL POC Yes HCG urine, Ql. (POC) Negative Negative AMB POC URINALYSIS DIP STICK AUTO W/O MICRO Result Value Ref Range Color (UA POC) Yellow Clarity (UA POC) Clear Glucose (UA POC) Negative Negative Bilirubin (UA POC) Negative Negative Ketones (UA POC) Negative Negative Specific gravity (UA POC) 1.020 1.001 - 1.035 Blood (UA POC) Trace Negative pH (UA POC) 7.5 4.6 - 8.0 Protein (UA POC) Negative Negative Urobilinogen (UA POC) 1 mg/dL 0.2 - 1 Nitrites (UA POC) Negative Negative Leukocyte esterase (UA POC) Negative Negative Assessment/Plan: 
Diagnoses and all orders for this visit: 
 
Vitamin D deficiency 
-     cholecalciferol, VITAMIN D3, (VITAMIN D3) 5,000 unit tab tablet; Take 1 Tab by mouth daily. , Normal, Disp-90 Tab, R-1 
-     cyanocobalamin (VITAMIN B-12) 500 mcg tablet; Take 1 Tab by mouth daily. , Normal, Disp-30 Tab, R-0 
-     folic acid (FOLVITE) 1 mg tablet; Take 1 Tab by mouth daily. , Normal, Disp-30 Tab, R-3 Patient Instructions Learning About Vitamin D Why is it important to get enough vitamin D? Your body needs vitamin D to absorb calcium. Calcium keeps your bones and muscles, including your heart, healthy and strong. If your muscles don't get enough calcium, they can cramp, hurt, or feel weak. You may have long-term (chronic) muscle aches and pains.  
If you don't get enough vitamin D throughout life, you have an increased chance of having thin and brittle bones (osteoporosis) in your later years. Children who don't get enough vitamin D may not grow as much as others their age. They also have a chance of getting a rare disease called rickets. It causes weak bones. Vitamin D and calcium are added to many foods. And your body uses sunshine to make its own vitamin D. How much vitamin D do you need? The [facility] recommends that people ages 3 through 79 get 600 IU (international units) every day. Adults 71 and older need 800 IU every day. Blood tests for vitamin D can check your vitamin D level. But there is no standard normal range used by all laboratories. You're likely getting enough vitamin D if your levels are in the range of 20 to 50 ng/mL. How can you get more vitamin D? Foods that contain vitamin D include: 
· [place], tuna, and mackerel. These are some of the best foods to eat when you need to get more vitamin D. 
· Cheese, egg yolks, and beef liver. These foods have vitamin D in small amounts. · Milk, soy drinks, orange juice, yogurt, margarine, and some kinds of cereal have vitamin D added to them. Some people don't make vitamin D as well as others. They may have to take extra care in getting enough vitamin D. Things that reduce how much vitamin D your body makes include: · Dark skin, such as many  Americans have. · Age, especially if you are older than 72. · Digestive problems, such as Crohn's or celiac disease. · Liver and kidney disease. Some people who do not get enough vitamin D may need supplements. Are there any risks from taking vitamin D? 
· Too much vitamin D: 
? Can damage your kidneys. ? Can cause nausea and vomiting, constipation, and weakness. ? Raises the amount of calcium in your blood. If this happens, you can get confused or have an irregular heart rhythm. · Vitamin D may interact with other medicines.  Tell your doctor about all of the medicines you take, including over-the-counter drugs, herbs, and pills. Tell your doctor about all of your current medical problems. Where can you learn more? Go to http://juan josé-gay.info/. Enter [id number] in the search box to learn more about \"Learning About Vitamin D.\" 
Current as of: March 28, 2018 Content Version: 11.9 © 7230-8620 [employer]. Care instructions adapted under license by [employer] (which disclaims liability or warranty for this information). If you have questions about a medical condition or this instruction, always ask your healthcare professional. [address] any warranty or liability for your use of this information. Follow-up Disposition: 
Return in about 6 months (around 8/7/2019), or if symptoms worsen or fail to improve, for routine follow up.

## 2019-02-07 NOTE — PATIENT INSTRUCTIONS
Learning About Vitamin D Why is it important to get enough vitamin D? Your body needs vitamin D to absorb calcium. Calcium keeps your bones and muscles, including your heart, healthy and strong. If your muscles don't get enough calcium, they can cramp, hurt, or feel weak. You may have long-term (chronic) muscle aches and pains. If you don't get enough vitamin D throughout life, you have an increased chance of having thin and brittle bones (osteoporosis) in your later years. Children who don't get enough vitamin D may not grow as much as others their age. They also have a chance of getting a rare disease called rickets. It causes weak bones. Vitamin D and calcium are added to many foods. And your body uses sunshine to make its own vitamin D. How much vitamin D do you need? The [place] of [facility] recommends that people ages 3 through 79 get 600 IU (international units) every day. Adults 71 and older need 800 IU every day. Blood tests for vitamin D can check your vitamin D level. But there is no standard normal range used by all laboratories. You're likely getting enough vitamin D if your levels are in the range of 20 to 50 ng/mL. How can you get more vitamin D? Foods that contain vitamin D include: 
· [place], tuna, and mackerel. These are some of the best foods to eat when you need to get more vitamin D. 
· Cheese, egg yolks, and beef liver. These foods have vitamin D in small amounts. · Milk, soy drinks, orange juice, yogurt, margarine, and some kinds of cereal have vitamin D added to them. Some people don't make vitamin D as well as others. They may have to take extra care in getting enough vitamin D. Things that reduce how much vitamin D your body makes include: · Dark skin, such as many  Americans have. · Age, especially if you are older than 72. · Digestive problems, such as Crohn's or celiac disease. · Liver and kidney disease. Some people who do not get enough vitamin D may need supplements. Are there any risks from taking vitamin D? 
· Too much vitamin D: 
? Can damage your kidneys. ? Can cause nausea and vomiting, constipation, and weakness. ? Raises the amount of calcium in your blood. If this happens, you can get confused or have an irregular heart rhythm. · Vitamin D may interact with other medicines. Tell your doctor about all of the medicines you take, including over-the-counter drugs, herbs, and pills. Tell your doctor about all of your current medical problems. Where can you learn more? Go to http://juan joséeriQoogay.info/. Enter [id number] in the search box to learn more about \"Learning About Vitamin D.\" 
Current as of: March 28, 2018 Content Version: 11.9 © 4839-6078 [employer]. Care instructions adapted under license by [employer] (which disclaims liability or warranty for this information). If you have questions about a medical condition or this instruction, always ask your healthcare professional. [address] any warranty or liability for your use of this information.

## 2019-07-08 ENCOUNTER — HOSPITAL ENCOUNTER (OUTPATIENT)
Dept: ULTRASOUND IMAGING | Age: 34
Discharge: HOME | End: 2019-07-08
Payer: COMMERCIAL

## 2019-07-08 DIAGNOSIS — D34 BENIGN NEOPLASM OF THYROID GLAND: ICD-10-CM

## 2019-07-08 PROCEDURE — 76536 US EXAM OF HEAD AND NECK: CPT

## 2020-06-25 ENCOUNTER — TELEPHONE (OUTPATIENT)
Dept: FAMILY MEDICINE CLINIC | Age: 35
End: 2020-06-25

## 2020-06-25 NOTE — TELEPHONE ENCOUNTER
Pt is concerned about a pain in her stomach since this morning     Pls give her a call at [phone]

## 2020-06-26 ENCOUNTER — VIRTUAL VISIT (OUTPATIENT)
Dept: FAMILY MEDICINE CLINIC | Age: 35
End: 2020-06-26

## 2020-06-26 DIAGNOSIS — Z13.29 SCREENING FOR THYROID DISORDER: ICD-10-CM

## 2020-06-26 DIAGNOSIS — R35.0 FREQUENCY OF URINATION: ICD-10-CM

## 2020-06-26 DIAGNOSIS — Z13.220 SCREENING CHOLESTEROL LEVEL: ICD-10-CM

## 2020-06-26 DIAGNOSIS — R10.32 LEFT LOWER QUADRANT ABDOMINAL PAIN: ICD-10-CM

## 2020-06-26 DIAGNOSIS — Z00.00 ENCOUNTER FOR ANNUAL PHYSICAL EXAM: Primary | ICD-10-CM

## 2020-06-26 DIAGNOSIS — R73.03 BORDERLINE DIABETES MELLITUS: ICD-10-CM

## 2020-06-26 DIAGNOSIS — E55.9 VITAMIN D DEFICIENCY: ICD-10-CM

## 2020-06-26 RX ORDER — CHOLECALCIFEROL TAB 125 MCG (5000 UNIT) 125 MCG
5000 TAB ORAL DAILY
Qty: 90 TAB | Refills: 1 | Status: SHIPPED | OUTPATIENT
Start: 2020-06-26 | End: 2021-10-22 | Stop reason: ALTCHOICE

## 2020-06-26 NOTE — PATIENT INSTRUCTIONS
A copy of lab result will be mailed to you. If you need to be seen for result discussion I will let you know

## 2020-06-26 NOTE — PROGRESS NOTES
Chief Complaint   Patient presents with    Abdominal Pain     started on 6/25/2020  took OTC medication     HPI:  Vinh Barron is a 37 y.o. female who was seen by synchronous (real-time) audio-video technology on 6/26/2020. Consent: Vinh Barron, who was seen by synchronous (real-time) audio-video technology, and/or her healthcare decision maker, is aware that this patient-initiated, Telehealth encounter on 6/26/2020 is a billable service, with coverage as determined by her insurance carrier. She is aware that she may receive a bill and has provided verbal consent to proceed: Yes. Assessment & Plan:   Diagnoses and all orders for this visit:    1. Encounter for annual physical exam  -     METABOLIC PANEL, COMPREHENSIVE  -     CBC W/O DIFF    2. Vitamin D deficiency  -     VITAMIN D, 25 HYDROXY  -     cholecalciferol (Vitamin D3) (5000 Units/125 mcg) tab tablet; Take 1 Tab by mouth daily. 3. Frequency of urination  -     URINALYSIS W/ RFLX MICROSCOPIC    4. Screening for thyroid disorder  -     TSH 3RD GENERATION    5. Screening cholesterol level  -     LIPID PANEL    6. Borderline diabetes mellitus  -     HEMOGLOBIN A1C WITH EAG    7. Left lower quadrant abdominal pain  -     CBC W/O DIFF      I spent at least 25 minutes on this visit with this established patient. 712  Subjective:   Vinh Barron is a 37 y.o. AA female with severe vit D def who was seen for one year follow up. Patient has been doing well until 6/25/20 when she developed acute left lower abdominal pain radiating to umbilicus. There was no diarrhea, nausea/vomiting. Pain was so much to that she could not eat. Every time she tried to eat solid food viral worsened. Pain subsided when she took OTC pain medication. Patient is symptom free today. LLQ abdominal pain could have been due to inflamed appendix. Advised her to to ER if pain should recur.   Also, she is due for physical and blood work    Prior to Admission medications    Medication Sig Start Date End Date Taking? Authorizing Provider   cholecalciferol, VITAMIN D3, (VITAMIN D3) 5,000 unit tab tablet Take 1 Tab by mouth daily. 2/7/19  Yes Edie Taveras MD   cyanocobalamin (VITAMIN B-12) 500 mcg tablet Take 1 Tab by mouth daily. 2/7/19  Yes Edie Taveras MD   folic acid (FOLVITE) 1 mg tablet Take 1 Tab by mouth daily. 2/7/19  Yes MD Ish Chingdea Lori 1.5/30, 21, 1.5-30 mg-mcg tab TAKE 1 TABLET BY MOUTH DAILY 1/25/19 6/26/20  Provider, Historical     Allergies   Allergen Reactions    Biaxin [Clarithromycin] Unknown (comments)    Pepto-Bismol [Bismuth Subsalicylate] Nausea and Vomiting     Patient Active Problem List   Diagnosis Code    Anxiety F41.9    Atypical chest pain R07.89    Migraine headache G43.909    TMJ (temporomandibular joint syndrome) M26.609    Costochondritis M94.0    Neck swelling R22.1    Throat pain R07.0    Laryngitis, acute J04.0    Hx: UTI (urinary tract infection) Z87.440    Thyroid nodule E04.1    Herpes exposure Z20.828    Acute bacterial pharyngitis J02.8, B96.89    Subacute pansinusitis J01.40    GERD without esophagitis K21.9     Current Outpatient Medications   Medication Sig Dispense Refill    cholecalciferol, VITAMIN D3, (VITAMIN D3) 5,000 unit tab tablet Take 1 Tab by mouth daily. 90 Tab 1    cyanocobalamin (VITAMIN B-12) 500 mcg tablet Take 1 Tab by mouth daily. 30 Tab 0    folic acid (FOLVITE) 1 mg tablet Take 1 Tab by mouth daily. 30 Tab 3     Allergies   Allergen Reactions    Biaxin [Clarithromycin] Unknown (comments)    Pepto-Bismol [Bismuth Subsalicylate] Nausea and Vomiting     Past Medical History:   Diagnosis Date    Anxiety     Vitamin D deficiency      Past Surgical History:   Procedure Laterality Date    HX LEEP PROCEDURE      HX OTHER SURGICAL      leep surgery, ectopic pregnancy     No family history on file.   Social History     Tobacco Use    Smoking status: Current Every Day Smoker     Types: Cigarettes    Smokeless tobacco: Never Used    Tobacco comment: began smoking again   Substance Use Topics    Alcohol use: Yes     Alcohol/week: 0.0 standard drinks     Comment: Social      ROS  As per hpi    Objective: There were no vitals taken for this visit. General: alert, cooperative, no distress   Mental  status: normal mood, behavior, speech, dress, motor activity, and thought processes, able to follow commands   HENT: NCAT   Neck: no visualized mass   Resp: no respiratory distress   Neuro: no gross deficits   Skin: no discoloration or lesions of concern on visible areas     Additional exam findings: We discussed the expected course, resolution and complications of the diagnosis(es) in detail. Medication risks, benefits, costs, interactions, and alternatives were discussed as indicated. I advised her to contact the office if her condition worsens, changes or fails to improve as anticipated. She expressed understanding with the diagnosis(es) and plan. Ld Lee is a 37 y.o. female who was evaluated by a video visit encounter for concerns as above. Patient identification was verified prior to start of the visit. A caregiver was present when appropriate. Due to this being a TeleHealth encounter (During [place] [address] public health emergency), evaluation of the following organ systems was limited: Vitals/Constitutional/EENT/Resp/CV/GI//MS/Neuro/Skin/Heme-Lymph-Imm. Pursuant to the emergency declaration under the [facility], [facility]5 waiver authority and the [employer] and [employer] Act, this Virtual  Visit was conducted, with patient's (and/or legal guardian's) consent, to reduce the patient's risk of exposure to COVID-19 and provide necessary medical care. Services were provided through a video synchronous discussion virtually to substitute for in-person clinic visit.   Patient and provider were located at their individual homes.       Katy Robert MD

## 2020-07-01 ENCOUNTER — TELEPHONE (OUTPATIENT)
Dept: FAMILY MEDICINE CLINIC | Age: 35
End: 2020-07-01

## 2020-07-01 NOTE — TELEPHONE ENCOUNTER
----- Message from Joao Smith sent at 7/1/2020  9:53 AM EDT -----  Regarding: Dr. Derian French / Felicita Socks: [phone]  Ms. Kary Majano is returning a call from Dr. Derian French, she received a call about test results and would like to be provided with them.

## 2020-07-02 ENCOUNTER — TELEPHONE (OUTPATIENT)
Dept: FAMILY MEDICINE CLINIC | Age: 35
End: 2020-07-02

## 2020-07-02 NOTE — TELEPHONE ENCOUNTER
----- Message from Yu Heller sent at 7/2/2020 11:24 AM EDT -----  Regarding: Dr. Mar Muñoz / telephone  Contact: [phone]  Caller's first and last name and relationship (if not the patient): n/a  Best contact number(s): [phone]  Whose call is being returned: the practice  Details to clarify the request: Pt. left a message yesterday and would like an immediate call back to receive test results.

## 2020-07-02 NOTE — TELEPHONE ENCOUNTER
Verified patient with two types of identifiers. States that she had labs done with her GYN and she wanted to know if we could call them to add on labs to her blood. I advised that since they did the lab work we could not add. Advised her to go online to [facility] and schedule an appt. She will do this. She did not want to wait at [address] [place] [address] and this is why she was asking.

## 2021-08-14 ENCOUNTER — OFFICE VISIT (OUTPATIENT)
Dept: URGENT CARE | Age: 36
End: 2021-08-14
Payer: COMMERCIAL

## 2021-08-14 VITALS — HEART RATE: 68 BPM | OXYGEN SATURATION: 99 % | TEMPERATURE: 98.4 F | RESPIRATION RATE: 16 BRPM

## 2021-08-14 DIAGNOSIS — Z20.822 SUSPECTED COVID-19 VIRUS INFECTION: Primary | ICD-10-CM

## 2021-08-14 LAB — SARS-COV-2 POC: NEGATIVE

## 2021-08-14 PROCEDURE — 99202 OFFICE O/P NEW SF 15 MIN: CPT | Performed by: FAMILY MEDICINE

## 2021-08-14 PROCEDURE — 87426 SARSCOV CORONAVIRUS AG IA: CPT | Performed by: FAMILY MEDICINE

## 2021-08-14 NOTE — LETTER
August 14, 2021  [address] [place] [address]    Dear Alexandre Snyder: Thank you for requesting access to [employer]. Please follow the instructions below to securely access and download your online medical record. [employer] allows you to send messages to your doctor, view your test results, renew your prescriptions, schedule appointments, and more. How Do I Sign Up? 1. In your internet browser, go to [email or web address]. 2. Click on the First Time User? Click Here link in the Sign In box. You will see the New Member Sign Up page. 3. Enter your [employer] Access Code exactly as it appears below. You will not need to use this code after youve completed the sign-up process. If you do not sign up before the expiration date, you must request a new code. [employer] Access Code: [email or web address]  Expires: 9/28/2021  1:24 PM     4. Enter the last four digits of your Social Security Number (xxxx) and Date of Birth (mm/dd/yyyy) as indicated and click Submit. You will be taken to the next sign-up page. 5. Create a [facility] ID. This will be your [employer] login ID and cannot be changed, so think of one that is secure and easy to remember. 6. Create a [facility] password. You can change your password at any time. 7. Enter your Password Reset Question and Answer. This can be used at a later time if you forget your password. 8. Enter your e-mail address. You will receive e-mail notification when new information is available in [address] 9. Click Sign Up. You can now view and download portions of your medical record. 10. Click the Download Summary menu link to download a portable copy of your medical information. Additional Information    If you have questions, please visit the Frequently Asked Questions section of the [employer] website at [email or web address] Remember, [employer] is NOT to be used for urgent needs. For medical emergencies, dial 911.     Now available from your iPhone and Android!     Sincerely,   [facility]

## 2021-08-24 ENCOUNTER — OFFICE VISIT (OUTPATIENT)
Dept: URGENT CARE | Age: 36
End: 2021-08-24
Payer: COMMERCIAL

## 2021-08-24 VITALS — OXYGEN SATURATION: 98 % | RESPIRATION RATE: 18 BRPM | TEMPERATURE: 98.4 F | HEART RATE: 94 BPM

## 2021-08-24 DIAGNOSIS — Z20.822 ENCOUNTER FOR LABORATORY TESTING FOR COVID-19 VIRUS: Primary | ICD-10-CM

## 2021-08-24 LAB — SARS-COV-2 POC: NEGATIVE

## 2021-08-24 PROCEDURE — 99213 OFFICE O/P EST LOW 20 MIN: CPT | Performed by: EMERGENCY MEDICINE

## 2021-08-24 PROCEDURE — 87426 SARSCOV CORONAVIRUS AG IA: CPT | Performed by: EMERGENCY MEDICINE

## 2021-08-24 NOTE — PROGRESS NOTES
Pt here with known COVID exposures but no Sx. They are here for screening test. This patient was seen in [facility] at [address] [place] [address] Urgent Care while outdoors at their vehicle due to COVID-19 pandemic with PPE and focused examination in order to decrease community viral transmission. Pt is aware POC may be inaccurate and that testing will not change quarantine but still requesting POC and will continue quarantine      The history is provided by the patient. Past Medical History:   Diagnosis Date    Anxiety     Vitamin D deficiency         Past Surgical History:   Procedure Laterality Date    HX LEEP PROCEDURE      HX OTHER SURGICAL      leep surgery, ectopic pregnancy         History reviewed. No pertinent family history. Social History     Socioeconomic History    Marital status: SINGLE     Spouse name: Not on file    Number of children: Not on file    Years of education: Not on file    Highest education level: Not on file   Occupational History    Not on file   Tobacco Use    Smoking status: Current Every Day Smoker     Types: Cigarettes    Smokeless tobacco: Never Used    Tobacco comment: began smoking again   Vaping Use    Vaping Use: Never used   Substance and Sexual Activity    Alcohol use: Yes     Alcohol/week: 0.0 standard drinks     Comment: Social     Drug use: No    Sexual activity: Yes     Partners: Male     Birth control/protection: Pill   Other Topics Concern    Not on file   Social History Narrative    Not on file     Social Determinants of Health     Financial Resource Strain:     Difficulty of Paying Living Expenses:    Food Insecurity:     Worried About Running Out of Food in the Last Year:     [address] in the Last Year:    Transportation Needs:     Lack of Transportation (Medical):      Lack of Transportation (Non-Medical):    Physical Activity:     Days of Exercise per Week:     Minutes of Exercise per Session:    Stress:     Feeling of Stress : Social Connections:     Frequency of Communication with Friends and Family:     Frequency of Social Gatherings with Friends and Family:     Attends [employer] Services:     Active Member of Clubs or Organizations:     Attends Club or Organization Meetings:     Marital Status:    Intimate Partner Violence:     Fear of Current or Ex-Partner:     Emotionally Abused:     Physically Abused:     Sexually Abused: ALLERGIES: Biaxin [clarithromycin] and Pepto-bismol [bismuth subsalicylate]    Review of Systems   Constitutional: Negative for chills, diaphoresis, fatigue and fever. HENT: Negative for congestion, ear pain, sinus pressure, sneezing, sore throat and trouble swallowing. Eyes: Negative for redness. Respiratory: Negative for cough and shortness of breath. Cardiovascular: Negative for chest pain. Gastrointestinal: Negative for abdominal pain, diarrhea, nausea and vomiting. Genitourinary:        Denies possibility of pregnancy   Musculoskeletal: Negative for arthralgias and myalgias. Skin: Negative for rash. Neurological: Negative for headaches. Vitals:    08/24/21 1240   Pulse: 94   Resp: 18   Temp: 98.4 °F (36.9 °C)   SpO2: 98%       Physical Exam  Vitals and nursing note reviewed. Constitutional:       General: She is not in acute distress. Appearance: Normal appearance. She is normal weight. She is not ill-appearing, toxic-appearing or diaphoretic. HENT:      Nose: No rhinorrhea. Mouth/Throat:      Mouth: Mucous membranes are moist.      Pharynx: Oropharynx is clear. No oropharyngeal exudate or posterior oropharyngeal erythema. Cardiovascular:      Rate and Rhythm: Normal rate and regular rhythm. Pulses: Normal pulses. Heart sounds: Normal heart sounds. Pulmonary:      Effort: Pulmonary effort is normal. No respiratory distress. Breath sounds: Normal breath sounds. No stridor. No wheezing, rhonchi or rales.       Comments: Conversational without cough or dyspnea    Musculoskeletal:      Cervical back: No muscular tenderness. Skin:     Findings: No rash. Neurological:      Mental Status: She is alert and oriented to person, place, and time. Psychiatric:         Mood and Affect: Mood normal.         Behavior: Behavior normal.         MDM    ICD-10-CM ICD-9-CM    1. Encounter for laboratory testing for COVID-19 virus  Z20.822 V01.79 AMB POC SARS-COV-2     No orders of the defined types were placed in this encounter. The patient's condition and possible alternative diagnoses were discussed with the patient and they verbalized understanding. The patient is to follow up with their primary care doctor for continued care. If signs and symptoms persist or become worse or new symptoms develop, the pt is to go immediately to the emergency department. Any new medications that may have been written for should be taken as directed but should always be discussed with the primary care physician and pharmacist. This was communicated to the patient. Pt instructed to quarantine until COVID testing results are back and then duration of quarantine will depend on result, current recommendations and symptoms. The patient is to get immediate re-evaluation for any new or worsening symptoms. They are to quarantine from other household members. It was recommended they stay hydrated and practice deep breathing exercises.          Procedures

## 2021-09-27 ENCOUNTER — VIRTUAL VISIT (OUTPATIENT)
Dept: FAMILY MEDICINE CLINIC | Age: 36
End: 2021-09-27
Payer: COMMERCIAL

## 2021-09-27 DIAGNOSIS — R11.2 NAUSEA AND VOMITING IN ADULT: ICD-10-CM

## 2021-09-27 DIAGNOSIS — R10.10 PAIN OF UPPER ABDOMEN: Primary | ICD-10-CM

## 2021-09-27 PROCEDURE — 99213 OFFICE O/P EST LOW 20 MIN: CPT | Performed by: INTERNAL MEDICINE

## 2021-09-27 NOTE — PROGRESS NOTES
Chief Complaint   Patient presents with    Abdominal Pain     x 3 days      HPI:  Yamileth Agee is a 31 y.o. female, evaluated via audio-only technology on 9/27/2021 for Abdominal Pain (x 3 days )    Assessment & Plan:   Diagnoses and all orders for this visit:    1. Pain of upper abdomen    2. Nausea and vomiting in adult      Subjective:   Yamileth Agee is a 31 y.o. female is seen for c/o abdominal pain, generalized, nausea/vomiting ongoing for 1.5 days. Denies fever/chills, blood in stool. Advised patient to go to ER for management and follow up after. Prior to Admission medications    Medication Sig Start Date End Date Taking? Authorizing Provider   cholecalciferol (Vitamin D3) (5000 Units/125 mcg) tab tablet Take 1 Tab by mouth daily. 6/26/20   Alta Plata MD   cyanocobalamin (VITAMIN B-12) 500 mcg tablet Take 1 Tab by mouth daily. 2/7/19   Alta Plata MD   folic acid (FOLVITE) 1 mg tablet Take 1 Tab by mouth daily. Patient not taking: Reported on 8/24/2021 2/7/19   Fidencio Barry MD     Patient Active Problem List   Diagnosis Code    Anxiety F41.9    Atypical chest pain R07.89    Migraine headache G43.909    TMJ (temporomandibular joint syndrome) M26.609    Costochondritis M94.0    Neck swelling R22.1    Throat pain R07.0    Laryngitis, acute J04.0    Hx: UTI (urinary tract infection) Z87.440    Thyroid nodule E04.1    Herpes exposure Z20.828    Acute bacterial pharyngitis J02.8, B96.89    Subacute pansinusitis J01.40    GERD without esophagitis K21.9     Current Outpatient Medications   Medication Sig Dispense Refill    cholecalciferol (Vitamin D3) (5000 Units/125 mcg) tab tablet Take 1 Tab by mouth daily. 90 Tab 1    cyanocobalamin (VITAMIN B-12) 500 mcg tablet Take 1 Tab by mouth daily. 30 Tab 0    folic acid (FOLVITE) 1 mg tablet Take 1 Tab by mouth daily.  (Patient not taking: Reported on 8/24/2021) 30 Tab 3     Allergies   Allergen Reactions    Biaxin [Clarithromycin] Unknown (comments)    Pepto-Bismol [Bismuth Subsalicylate] Nausea and Vomiting     Past Medical History:   Diagnosis Date    Anxiety     Vitamin D deficiency      Past Surgical History:   Procedure Laterality Date    HX LEEP PROCEDURE      HX OTHER SURGICAL      leep surgery, ectopic pregnancy     No family history on file. Social History     Tobacco Use    Smoking status: Current Every Day Smoker     Types: Cigarettes    Smokeless tobacco: Never Used    Tobacco comment: began smoking again   Substance Use Topics    Alcohol use: Yes     Alcohol/week: 0.0 standard drinks     Comment: Social      ROS   As per hpi    Patient-Reported Vitals 9/27/2021   Patient-Reported LMP [id number]       March Stefania, who was evaluated through a patient-initiated, synchronous (real-time) audio only encounter, and/or her healthcare decision maker, is aware that it is a billable service, with coverage as determined by her insurance carrier. She provided verbal consent to proceed: Yes. She has not had a related appointment within my department in the past 7 days or scheduled within the next 24 hours.     Matthew Hogan MD

## 2021-09-27 NOTE — LETTER
NOTIFICATION RETURN TO WORK    9/27/2021 4:36 PM    Ms. Denny Chao  [id number]      To Whom It May Concern:    Denny Chao was under the care of Ion Amin 9/23/2021    If there are questions or concerns please have the patient contact our office.         Sincerely,        Jennifer James MD

## 2021-10-05 ENCOUNTER — OFFICE VISIT (OUTPATIENT)
Dept: FAMILY MEDICINE CLINIC | Age: 36
End: 2021-10-05
Payer: COMMERCIAL

## 2021-10-05 VITALS
BODY MASS INDEX: 27.6 KG/M2 | HEIGHT: 62 IN | HEART RATE: 87 BPM | SYSTOLIC BLOOD PRESSURE: 115 MMHG | RESPIRATION RATE: 20 BRPM | WEIGHT: 150 LBS | DIASTOLIC BLOOD PRESSURE: 75 MMHG | TEMPERATURE: 97.5 F | OXYGEN SATURATION: 99 %

## 2021-10-05 DIAGNOSIS — Z13.29 SCREENING FOR THYROID DISORDER: ICD-10-CM

## 2021-10-05 DIAGNOSIS — R35.0 FREQUENCY OF URINATION: ICD-10-CM

## 2021-10-05 DIAGNOSIS — R73.03 BORDERLINE DIABETES MELLITUS: ICD-10-CM

## 2021-10-05 DIAGNOSIS — H83.02 INFECTION OF LEFT INNER EAR: ICD-10-CM

## 2021-10-05 DIAGNOSIS — Z13.220 SCREENING CHOLESTEROL LEVEL: ICD-10-CM

## 2021-10-05 DIAGNOSIS — E55.9 VITAMIN D DEFICIENCY: ICD-10-CM

## 2021-10-05 DIAGNOSIS — Z32.00 POSSIBLE PREGNANCY: ICD-10-CM

## 2021-10-05 DIAGNOSIS — Z11.59 NEED FOR HEPATITIS C SCREENING TEST: ICD-10-CM

## 2021-10-05 DIAGNOSIS — Z00.00 ENCOUNTER FOR ANNUAL PHYSICAL EXAM: Primary | ICD-10-CM

## 2021-10-05 PROBLEM — N97.1 FEMALE INFERTILITY DUE TO OCCLUSION OF FALLOPIAN TUBE: Status: ACTIVE | Noted: 2021-03-01

## 2021-10-05 PROBLEM — K43.9 HERNIA OF ANTERIOR ABDOMINAL WALL: Status: ACTIVE | Noted: 2021-03-01

## 2021-10-05 LAB
HCG URINE, QL. (POC): NEGATIVE
VALID INTERNAL CONTROL?: YES

## 2021-10-05 PROCEDURE — 81025 URINE PREGNANCY TEST: CPT | Performed by: INTERNAL MEDICINE

## 2021-10-05 PROCEDURE — 99395 PREV VISIT EST AGE 18-39: CPT | Performed by: INTERNAL MEDICINE

## 2021-10-05 RX ORDER — OFLOXACIN 3 MG/ML
5 SOLUTION AURICULAR (OTIC) DAILY
Qty: 10 ML | Refills: 0 | Status: SHIPPED | OUTPATIENT
Start: 2021-10-05

## 2021-10-05 NOTE — LETTER
NOTIFICATION RETURN TO WORK     10/5/2021 1:50 PM    Ms. Carlos Mijares  [phone]      To Whom It May Concern:    Carlos Mijares is currently under the care of Ion Amin. Ms. Darby Wolff was seen in clinic today. Based on her current health condition  I strongly advised her to work from home until she is re-evaluated in a week. I do appreciate your understanding to work with Chermaximo Alonso. If there are questions or concerns please have the patient contact our office.         Sincerely,      Celi Mojica MD

## 2021-10-05 NOTE — PROGRESS NOTES
Chief Complaint   Patient presents with    Physical    Dizziness     HPI:  Shana Arevalo is a 31 y.o. AA female with h/o anxiety and vit D def presents for annual physical exam.  Patient has complaints of dizziness, faintness, lightheadedness. Denies cough, sob, wheezing, ear aches. Review of Systems  As per hpi    Past Medical History:   Diagnosis Date    Anxiety     Vitamin D deficiency      Past Surgical History:   Procedure Laterality Date    HX LEEP PROCEDURE      HX OTHER SURGICAL      leep surgery, ectopic pregnancy     Social History     Socioeconomic History    Marital status: SINGLE     Spouse name: Not on file    Number of children: Not on file    Years of education: Not on file    Highest education level: Not on file   Tobacco Use    Smoking status: Current Every Day Smoker     Types: Cigarettes    Smokeless tobacco: Never Used    Tobacco comment: began smoking again   Vaping Use    Vaping Use: Never used   Substance and Sexual Activity    Alcohol use: Yes     Alcohol/week: 0.0 standard drinks     Comment: Social     Drug use: No    Sexual activity: Yes     Partners: Male     Birth control/protection: Pill     Social Determinants of Health     Financial Resource Strain:     Difficulty of Paying Living Expenses:    Food Insecurity:     Worried About Running Out of Food in the Last Year:     [address] in the Last Year:    Transportation Needs:     Lack of Transportation (Medical):  Lack of Transportation (Non-Medical):    Physical Activity:     Days of Exercise per Week:     Minutes of Exercise per Session:    Stress:     Feeling of Stress :    Social Connections:     Frequency of Communication with Friends and Family:     Frequency of Social Gatherings with Friends and Family:     Attends [employer] Services:     Active Member of Clubs or Organizations:     Attends Club or Organization Meetings:     Marital Status:      No family history on file.   Current Outpatient Medications   Medication Sig Dispense Refill    cholecalciferol (Vitamin D3) (5000 Units/125 mcg) tab tablet Take 1 Tab by mouth daily. 90 Tab 1    cyanocobalamin (VITAMIN B-12) 500 mcg tablet Take 1 Tab by mouth daily. 30 Tab 0    folic acid (FOLVITE) 1 mg tablet Take 1 Tab by mouth daily. (Patient not taking: Reported on 8/24/2021) 30 Tab 3     Allergies   Allergen Reactions    Biaxin [Clarithromycin] Unknown (comments)    Pepto-Bismol [Bismuth Subsalicylate] Nausea and Vomiting       Objective:  Visit Vitals  /75   Pulse 87   Temp 97.5 °F (36.4 °C) (Temporal)   Resp 20   Ht 5' 2\" (1.575 m)   Wt 150 lb (68 kg)   LMP 10/02/2021   SpO2 99%   BMI 27.44 kg/m²     Physical Exam:   General appearance - alert, well appearing in no distress  Mental status - alert, oriented to person, place, and time  EYE-PERRL, EOMI  ENT-impacted wax of left ear, no neck nodes or sinus tenderness  Neck - supple, no thyromegaly, no JVD    Chest - clear to auscultation, no wheezes, rales or rhonchi  Heart - normal rate, regular rhythm,  no murmurs  Abdomen - soft, nontender, nondistended, no organomegaly  Ext-peripheral pulses normal, no pedal edema  Neuro - no focal findings   Back-full range of motion, no tenderness, palpable spasm or pain on motion     Assessment/Plan:  Diagnoses and all orders for this visit:    Encounter for annual physical exam  -     METABOLIC PANEL, COMPREHENSIVE; Future  -     CBC W/O DIFF; Future    Possible pregnancy  -     AMB POC URINE PREGNANCY TEST, VISUAL COLOR COMPARISON    Infection of left inner ear  -     METABOLIC PANEL, COMPREHENSIVE; Future  -     CBC W/O DIFF; Future  -     ofloxacin (FLOXIN) 0.3 % otic solution; Administer 5 Drops in left ear daily. , Normal, Disp-10 mL, R-0    Need for hepatitis C screening test  -     HEPATITIS C AB;  Future    Vitamin D deficiency  -     VITAMIN D, 25 HYDROXY; Future    Frequency of urination  -     URINALYSIS W/ RFLX MICROSCOPIC; Future    Screening for thyroid disorder  -     TSH 3RD GENERATION; Future    Borderline diabetes mellitus  -     HEMOGLOBIN A1C WITH EAG; Future    Screening cholesterol level  -     LIPID PANEL; Future      Patient Instructions          Labyrinthitis: Care Instructions  Your Care Instructions     Labyrinthitis (say \"lab-uh-rin-THY-tus\") is a problem deep inside your inner ear. It happens when the labyrinth gets inflamed. That's the part of your inner ear that helps control your balance. The problem may cause vertigo. Vertigo makes you feel like you're spinning or whirling. You may feel sick to your stomach or vomit. You may lose your hearing for a while. Or you may have a ringing sound in your ears. Most of the time, labyrinthitis goes away on its own. This often takes several weeks. If the problem is caused by bacteria, your doctor will give you antibiotics. But most cases are caused by a virus. A virus can't be cured with antibiotics. Your doctor may give you medicines to help control the nausea and vomiting. Follow-up care is a key part of your treatment and safety. Be sure to make and go to all appointments, and call your doctor if you are having problems. It's also a good idea to know your test results and keep a list of the medicines you take. How can you care for yourself at home? · Try bed rest and keeping your head still for the first few days you have vertigo. This may help the vertigo and reduce nausea and vomiting. · Return to your normal activities if vertigo lasts more than a few days. This may be hard, but it usually helps your brain adapt to the vertigo more quickly. As your brain adapts, vertigo will slowly go away. · Do what you can to prevent falls. For example, keep your home uncluttered, and use nonskid mats around your house and in your bath. Vertigo makes you more likely to fall. · Try balance exercises for vertigo if your doctor suggests it.  An example is to stand with your feet together, arms at your sides. Hold this position for 30 seconds. · Do the Hill-Daroff exercise if your doctor suggests it. It may help your brain adapt to vertigo. ? Sit on the edge of your bed or sofa. ? Quickly lie down on one side. ? Stay in this position until the vertigo goes away or for at least 30 seconds. ? Sit up. If this causes vertigo, wait for it to stop. ? Do the exercise on the other side. ? Repeat these steps 10 times. Do the exercise 2 times a day until the vertigo is gone. · Take your medicines exactly as prescribed. Call your doctor if you think you are having a problem with your medicine. · If your doctor prescribed antibiotics, take them as directed. Do not stop taking them just because you feel better. You need to take the full course of antibiotics. When should you call for help? Call 911 anytime you think you may need emergency care. For example, call if:    · You passed out (lost consciousness).     · You have symptoms of a stroke. These may include:  ? Sudden numbness, tingling, weakness, or loss of movement in your face, arm, or leg, especially on only one side of your body. ? Sudden vision changes. ? Sudden trouble speaking. ? Sudden confusion or trouble understanding simple statements. ? Sudden problems with walking or balance. ? A sudden, severe headache that is different from past headaches. Call your doctor now or seek immediate medical care if:    · You have new or worse dizziness.     · You notice changes in your hearing. Watch closely for changes in your health, and be sure to contact your doctor if:    · You have new or worse nausea or vomiting.     · Your vertigo gets worse.     · Your vertigo has not gotten better in 2 weeks. Where can you learn more? Go to http://www.gray.com/  Enter G320 in the search box to learn more about \"Labyrinthitis: Care Instructions. \"  Current as of: December 2, 2020               Content Version: 13.0  © 2006-2021 Healthwise, [employer]. Care instructions adapted under license by [employer] (which disclaims liability or warranty for this information). If you have questions about a medical condition or this instruction, always ask your healthcare professional. [address] any warranty or liability for your use of this information. Follow-up and Dispositions    · Return in about 2 weeks (around 10/19/2021) for f/u results and treatment.

## 2021-10-05 NOTE — PATIENT INSTRUCTIONS
Labyrinthitis: Care Instructions  Your Care Instructions     Labyrinthitis (say \"lab-uh-rin-THY-tus\") is a problem deep inside your inner ear. It happens when the labyrinth gets inflamed. That's the part of your inner ear that helps control your balance. The problem may cause vertigo. Vertigo makes you feel like you're spinning or whirling. You may feel sick to your stomach or vomit. You may lose your hearing for a while. Or you may have a ringing sound in your ears. Most of the time, labyrinthitis goes away on its own. This often takes several weeks. If the problem is caused by bacteria, your doctor will give you antibiotics. But most cases are caused by a virus. A virus can't be cured with antibiotics. Your doctor may give you medicines to help control the nausea and vomiting. Follow-up care is a key part of your treatment and safety. Be sure to make and go to all appointments, and call your doctor if you are having problems. It's also a good idea to know your test results and keep a list of the medicines you take. How can you care for yourself at home? · Try bed rest and keeping your head still for the first few days you have vertigo. This may help the vertigo and reduce nausea and vomiting. · Return to your normal activities if vertigo lasts more than a few days. This may be hard, but it usually helps your brain adapt to the vertigo more quickly. As your brain adapts, vertigo will slowly go away. · Do what you can to prevent falls. For example, keep your home uncluttered, and use nonskid mats around your house and in your bath. Vertigo makes you more likely to fall. · Try balance exercises for vertigo if your doctor suggests it. An example is to stand with your feet together, arms at your sides. Hold this position for 30 seconds. · Do the Hill-Daroff exercise if your doctor suggests it. It may help your brain adapt to vertigo. ? Sit on the edge of your bed or sofa.   ? Quickly lie down on one side.  ? Stay in this position until the vertigo goes away or for at least 30 seconds. ? Sit up. If this causes vertigo, wait for it to stop. ? Do the exercise on the other side. ? Repeat these steps 10 times. Do the exercise 2 times a day until the vertigo is gone. · Take your medicines exactly as prescribed. Call your doctor if you think you are having a problem with your medicine. · If your doctor prescribed antibiotics, take them as directed. Do not stop taking them just because you feel better. You need to take the full course of antibiotics. When should you call for help? Call 911 anytime you think you may need emergency care. For example, call if:    · You passed out (lost consciousness).     · You have symptoms of a stroke. These may include:  ? Sudden numbness, tingling, weakness, or loss of movement in your face, arm, or leg, especially on only one side of your body. ? Sudden vision changes. ? Sudden trouble speaking. ? Sudden confusion or trouble understanding simple statements. ? Sudden problems with walking or balance. ? A sudden, severe headache that is different from past headaches. Call your doctor now or seek immediate medical care if:    · You have new or worse dizziness.     · You notice changes in your hearing. Watch closely for changes in your health, and be sure to contact your doctor if:    · You have new or worse nausea or vomiting.     · Your vertigo gets worse.     · Your vertigo has not gotten better in 2 weeks. Where can you learn more? Go to http://www.gray.com/  Enter I818 in the search box to learn more about \"Labyrinthitis: Care Instructions. \"  Current as of: December 2, 2020               Content Version: 13.0  © 2176-0927 [employer]. Care instructions adapted under license by [employer] (which disclaims liability or warranty for this information).  If you have questions about a medical condition or this instruction, always ask your healthcare professional. [place] [address] any warranty or liability for your use of this information.

## 2021-10-13 NOTE — TELEPHONE ENCOUNTER
Patient would like to get her lab results and also to let Hugo Kurtz know that she is still having dizzy spells she is worried she would like to get a CT scan done of her sinuses she can be reached @ [phone]

## 2021-10-13 NOTE — TELEPHONE ENCOUNTER
Return call still having dizziness,tension behind right eye. Taking prescribed medication.  Spoke with Dr. Faith Schultz will send in meclinize  Use as directed and follow up in office on 10/22/21

## 2021-10-14 RX ORDER — MECLIZINE HCL 12.5 MG 12.5 MG/1
12.5 TABLET ORAL
Qty: 30 TABLET | Refills: 0 | Status: SHIPPED | OUTPATIENT
Start: 2021-10-14 | End: 2021-10-24

## 2021-10-22 ENCOUNTER — OFFICE VISIT (OUTPATIENT)
Dept: FAMILY MEDICINE CLINIC | Age: 36
End: 2021-10-22
Payer: COMMERCIAL

## 2021-10-22 VITALS
HEART RATE: 81 BPM | TEMPERATURE: 97.9 F | RESPIRATION RATE: 16 BRPM | WEIGHT: 151.2 LBS | OXYGEN SATURATION: 98 % | SYSTOLIC BLOOD PRESSURE: 111 MMHG | DIASTOLIC BLOOD PRESSURE: 72 MMHG | BODY MASS INDEX: 27.82 KG/M2 | HEIGHT: 62 IN

## 2021-10-22 DIAGNOSIS — T36.95XA ANTIBIOTIC-INDUCED YEAST INFECTION: ICD-10-CM

## 2021-10-22 DIAGNOSIS — B37.9 ANTIBIOTIC-INDUCED YEAST INFECTION: ICD-10-CM

## 2021-10-22 DIAGNOSIS — D75.89 MACROCYTOSIS WITHOUT ANEMIA: ICD-10-CM

## 2021-10-22 DIAGNOSIS — E55.9 VITAMIN D DEFICIENCY: ICD-10-CM

## 2021-10-22 DIAGNOSIS — Z01.419 ENCOUNTER FOR GYNECOLOGICAL EXAMINATION: ICD-10-CM

## 2021-10-22 DIAGNOSIS — N30.01 ACUTE CYSTITIS WITH HEMATURIA: Primary | ICD-10-CM

## 2021-10-22 PROCEDURE — 99214 OFFICE O/P EST MOD 30 MIN: CPT | Performed by: INTERNAL MEDICINE

## 2021-10-22 RX ORDER — LANOLIN ALCOHOL/MO/W.PET/CERES
500 CREAM (GRAM) TOPICAL DAILY
Qty: 30 TABLET | Refills: 0 | Status: SHIPPED | OUTPATIENT
Start: 2021-10-22

## 2021-10-22 RX ORDER — FOLIC ACID 1 MG/1
1 TABLET ORAL DAILY
Qty: 30 TABLET | Refills: 3 | Status: SHIPPED | OUTPATIENT
Start: 2021-10-22

## 2021-10-22 RX ORDER — ERGOCALCIFEROL 1.25 MG/1
50000 CAPSULE ORAL
Qty: 1 CAPSULE | Refills: 3 | Status: SHIPPED | OUTPATIENT
Start: 2021-10-22 | End: 2021-12-02

## 2021-10-22 RX ORDER — FLUCONAZOLE 150 MG/1
150 TABLET ORAL DAILY
Qty: 1 TABLET | Refills: 0 | Status: SHIPPED | OUTPATIENT
Start: 2021-10-22 | End: 2021-10-23

## 2021-10-22 RX ORDER — CIPROFLOXACIN 500 MG/1
500 TABLET ORAL 2 TIMES DAILY
Qty: 10 TABLET | Refills: 0 | Status: SHIPPED | OUTPATIENT
Start: 2021-10-22 | End: 2021-10-27

## 2021-10-22 NOTE — PATIENT INSTRUCTIONS
Learning About High-Vitamin D Foods  What foods are high in vitamin D? The foods you eat contain nutrients, such as vitamins and minerals. Vitamin D is a nutrient. Your body needs the right amount to stay healthy and work as it should. You can use the list below to help you make choices about which foods to eat. Here are some foods that contain vitamin D. Fruits  · Orange juice, fortified with vitamin D  Grains  · Cereals, fortified with vitamin D  Dairy and dairy alternatives  · Milk, fortified with vitamin D  · Non-dairy milk (almond, rice, soy), fortified with vitamin D  · Yogurt, fortified with vitamin D  Protein foods  · Flounder  · Mackerel  · Sardines  · [place]  · Sole  · [place]  · [address]  · Cod liver oil  Work with your doctor to find out how much of this nutrient you need. Depending on your health, you may need more or less of it in your diet. Where can you learn more? Go to http://www.gray.com/  Enter V450 in the search box to learn more about \"Learning About High-Vitamin D Foods. \"  Current as of: December 17, 2020               Content Version: 13.0  © 8547-3445 Healthwise[employer]. Care instructions adapted under license by [employer] (which disclaims liability or warranty for this information). If you have questions about a medical condition or this instruction, always ask your healthcare professional. [address] any warranty or liability for your use of this information.

## 2021-10-22 NOTE — LETTER
10/22/2021    Ms. Kristy Gage  [phone]    Dear Kristy Gage:    Please find your most recent results below.     Urine is turbid with trace blood and 1+ bacteria, no leuk or nitrite  Serum vit D level is low, LDL is trending up    Resulted Orders   TSH 3RD GENERATION   Result Value Ref Range    TSH 0.94 0.36 - 3.74 uIU/mL   URINALYSIS W/ RFLX MICROSCOPIC   Result Value Ref Range    Color YELLOW/STRAW      Appearance TURBID (A) CLEAR      Specific gravity 1.020 1.003 - 1.030      pH (UA) 5.5 5.0 - 8.0      Protein Negative Negative mg/dL    Glucose Negative Negative mg/dL    Ketone Negative Negative mg/dL    Bilirubin Negative Negative      Blood TRACE (A) Negative      Urobilinogen 0.2 0.2 - 1.0 EU/dL    Nitrites Negative Negative      Leukocyte Esterase Negative Negative      WBC 0-4 0 - 4 /hpf    RBC 0-5 0 - 5 /hpf    Epithelial cells FEW FEW /lpf    Bacteria 1+ (A) Negative /hpf    Amorphous Crystals 2+ (A) Negative   VITAMIN D, 25 HYDROXY   Result Value Ref Range    Vitamin D 25-Hydroxy 14.5 (L) 30 - 100 ng/mL   HEMOGLOBIN A1C WITH EAG   Result Value Ref Range    Hemoglobin A1c 5.0 4.0 - 5.6 %    Est. average glucose 97 mg/dL   LIPID PANEL   Result Value Ref Range    Cholesterol, total 196 <200 MG/DL    Triglyceride 94 <150 MG/DL    HDL Cholesterol 46 MG/DL    LDL, calculated 131.2 (H) 0 - 100 MG/DL    VLDL, calculated 18.8 MG/DL    CHOL/HDL Ratio 4.3 0.0 - 5.0     CBC W/O DIFF   Result Value Ref Range    WBC 9.9 3.6 - 11.0 K/uL    RBC 4.16 3.80 - 5.20 M/uL    HGB 12.8 11.5 - 16.0 g/dL    HCT 41.5 35.0 - 47.0 %    MCV 99.8 (H) 80.0 - 99.0 FL    MCH 30.8 26.0 - 34.0 PG    MCHC 30.8 30.0 - 36.5 g/dL    RDW 14.6 (H) 11.5 - 14.5 %    PLATELET 934 473 - 593 K/uL    MPV 10.5 8.9 - 12.9 FL    NRBC 0.0 0  WBC    ABSOLUTE NRBC 0.00 0.00 - 8.76 K/uL   METABOLIC PANEL, COMPREHENSIVE   Result Value Ref Range    Sodium 140 136 - 145 mmol/L    Potassium 4.4 3.5 - 5.1 mmol/L    Chloride 106 97 - 108 mmol/L    CO2 27 21 - 32 mmol/L    Anion gap 7 5 - 15 mmol/L    Glucose 90 65 - 100 mg/dL    BUN 9 6 - 20 MG/DL    Creatinine 0.82 0.55 - 1.02 MG/DL    BUN/Creatinine ratio 11 (L) 12 - 20      GFR est AA >60 >60 ml/min/1.73m2    GFR est non-AA >60 >60 ml/min/1.73m2    Calcium 9.8 8.5 - 10.1 MG/DL    Bilirubin, total 0.6 0.2 - 1.0 MG/DL    ALT (SGPT) 25 12 - 78 U/L    AST (SGOT) 28 15 - 37 U/L    Alk. phosphatase 100 45 - 117 U/L    Protein, total 8.2 6.4 - 8.2 g/dL    Albumin 4.0 3.5 - 5.0 g/dL    Globulin 4.2 (H) 2.0 - 4.0 g/dL    A-G Ratio 1.0 (L) 1.1 - 2.2     HEPATITIS C AB   Result Value Ref Range    Hep C virus Ab Interp. NONREACTIVE NONREACTIVE     SAMPLES BEING HELD   Result Value Ref Range    SAMPLES BEING HELD 1uc     COMMENT        Add-on orders for these samples will be processed based on acceptable specimen integrity and analyte stability, which may vary by analyte. RECOMMENDATIONS:  Work on diet and exercise. Continue with current  medications.     Please call me if you have any questions: [phone]    Sincerely,      Murtaza Foy MD

## 2021-10-22 NOTE — PROGRESS NOTES
Chief Complaint   Patient presents with    Follow-up     2 week fu      HPI:  Merlin Redden is a 31 y.o. AA female presents for 2 week follow-up for lab review. Urine is turbid with trace blood and 1+ bacteria, no leuk or nitrite. Serum vit D level is low, LDL is trending up. RBC shows macrocytosis. All the other results are within normal limits. Results and management are discussed. Diet and exercise are encouraged. Review of Systems  As per hpi    Past Medical History:   Diagnosis Date    Anxiety     Vitamin D deficiency      Past Surgical History:   Procedure Laterality Date    HX LEEP PROCEDURE      HX OTHER SURGICAL      leep surgery, ectopic pregnancy     Social History     Socioeconomic History    Marital status: SINGLE     Spouse name: Not on file    Number of children: Not on file    Years of education: Not on file    Highest education level: Not on file   Tobacco Use    Smoking status: Current Every Day Smoker     Types: Cigarettes    Smokeless tobacco: Never Used    Tobacco comment: began smoking again   Vaping Use    Vaping Use: Never used   Substance and Sexual Activity    Alcohol use: Yes     Alcohol/week: 0.0 standard drinks     Comment: Social     Drug use: No    Sexual activity: Yes     Partners: Male     Birth control/protection: Pill     Social Determinants of Health     Financial Resource Strain:     Difficulty of Paying Living Expenses:    Food Insecurity:     Worried About Running Out of Food in the Last Year:     [address] in the Last Year:    Transportation Needs:     Lack of Transportation (Medical):      Lack of Transportation (Non-Medical):    Physical Activity:     Days of Exercise per Week:     Minutes of Exercise per Session:    Stress:     Feeling of Stress :    Social Connections:     Frequency of Communication with Friends and Family:     Frequency of Social Gatherings with Friends and Family:     Attends Orthodox Services:     Active Member of Clubs or Organizations:     Attends Club or Organization Meetings:     Marital Status:      History reviewed. No pertinent family history. Current Outpatient Medications   Medication Sig Dispense Refill    meclizine (ANTIVERT) 12.5 mg tablet Take 1 Tablet by mouth three (3) times daily as needed for Dizziness for up to 10 days. 30 Tablet 0    ofloxacin (FLOXIN) 0.3 % otic solution Administer 5 Drops in left ear daily. 10 mL 0    cholecalciferol (Vitamin D3) (5000 Units/125 mcg) tab tablet Take 1 Tab by mouth daily. 90 Tab 1    cyanocobalamin (VITAMIN B-12) 500 mcg tablet Take 1 Tab by mouth daily.  30 Tab 0     Allergies   Allergen Reactions    Biaxin [Clarithromycin] Unknown (comments)    Pepto-Bismol [Bismuth Subsalicylate] Nausea and Vomiting       Objective:  Visit Vitals  /72   Pulse 81   Temp 97.9 °F (36.6 °C) (Temporal)   Resp 16   Ht 5' 2\" (1.575 m)   Wt 151 lb 3.2 oz (68.6 kg)   LMP 10/02/2021   SpO2 98%   BMI 27.65 kg/m²     Physical Exam:   General appearance - alert, well appearing in no distress  Mental status - alert, oriented to person, place, and time  Chest - clear to auscultation, no wheezes, rales or rhonchi  Heart - normal rate, regular rhythm, no murmurs  Abdomen - soft, nontender, nondistended, no organomegaly  Ext-peripheral pulses normal, no pedal edema  Neuro -no focal findings    Results for orders placed or performed in visit on 10/05/21   TSH 3RD GENERATION   Result Value Ref Range    TSH 0.94 0.36 - 3.74 uIU/mL   URINALYSIS W/ RFLX MICROSCOPIC   Result Value Ref Range    Color YELLOW/STRAW      Appearance TURBID (A) CLEAR      Specific gravity 1.020 1.003 - 1.030      pH (UA) 5.5 5.0 - 8.0      Protein Negative Negative mg/dL    Glucose Negative Negative mg/dL    Ketone Negative Negative mg/dL    Bilirubin Negative Negative      Blood TRACE (A) Negative      Urobilinogen 0.2 0.2 - 1.0 EU/dL    Nitrites Negative Negative      Leukocyte Esterase Negative Negative      WBC 0-4 0 - 4 /hpf    RBC 0-5 0 - 5 /hpf    Epithelial cells FEW FEW /lpf    Bacteria 1+ (A) Negative /hpf    Amorphous Crystals 2+ (A) Negative   VITAMIN D, 25 HYDROXY   Result Value Ref Range    Vitamin D 25-Hydroxy 14.5 (L) 30 - 100 ng/mL   HEMOGLOBIN A1C WITH EAG   Result Value Ref Range    Hemoglobin A1c 5.0 4.0 - 5.6 %    Est. average glucose 97 mg/dL   LIPID PANEL   Result Value Ref Range    Cholesterol, total 196 <200 MG/DL    Triglyceride 94 <150 MG/DL    HDL Cholesterol 46 MG/DL    LDL, calculated 131.2 (H) 0 - 100 MG/DL    VLDL, calculated 18.8 MG/DL    CHOL/HDL Ratio 4.3 0.0 - 5.0     CBC W/O DIFF   Result Value Ref Range    WBC 9.9 3.6 - 11.0 K/uL    RBC 4.16 3.80 - 5.20 M/uL    HGB 12.8 11.5 - 16.0 g/dL    HCT 41.5 35.0 - 47.0 %    MCV 99.8 (H) 80.0 - 99.0 FL    MCH 30.8 26.0 - 34.0 PG    MCHC 30.8 30.0 - 36.5 g/dL    RDW 14.6 (H) 11.5 - 14.5 %    PLATELET 610 725 - 303 K/uL    MPV 10.5 8.9 - 12.9 FL    NRBC 0.0 0  WBC    ABSOLUTE NRBC 0.00 0.00 - 1.84 K/uL   METABOLIC PANEL, COMPREHENSIVE   Result Value Ref Range    Sodium 140 136 - 145 mmol/L    Potassium 4.4 3.5 - 5.1 mmol/L    Chloride 106 97 - 108 mmol/L    CO2 27 21 - 32 mmol/L    Anion gap 7 5 - 15 mmol/L    Glucose 90 65 - 100 mg/dL    BUN 9 6 - 20 MG/DL    Creatinine 0.82 0.55 - 1.02 MG/DL    BUN/Creatinine ratio 11 (L) 12 - 20      GFR est AA >60 >60 ml/min/1.73m2    GFR est non-AA >60 >60 ml/min/1.73m2    Calcium 9.8 8.5 - 10.1 MG/DL    Bilirubin, total 0.6 0.2 - 1.0 MG/DL    ALT (SGPT) 25 12 - 78 U/L    AST (SGOT) 28 15 - 37 U/L    Alk. phosphatase 100 45 - 117 U/L    Protein, total 8.2 6.4 - 8.2 g/dL    Albumin 4.0 3.5 - 5.0 g/dL    Globulin 4.2 (H) 2.0 - 4.0 g/dL    A-G Ratio 1.0 (L) 1.1 - 2.2     HEPATITIS C AB   Result Value Ref Range    Hep C virus Ab Interp.  NONREACTIVE NONREACTIVE     SAMPLES BEING HELD   Result Value Ref Range    SAMPLES BEING HELD 1uc     COMMENT        Add-on orders for these samples will be processed based on acceptable specimen integrity and analyte stability, which may vary by analyte. Assessment/Plan:  Diagnoses and all orders for this visit:    Acute cystitis with hematuria  -     ciprofloxacin HCl (CIPRO) 500 mg tablet; Take 1 Tablet by mouth two (2) times a day for 5 days. , Normal, Disp-10 Tablet, R-0    Vitamin D deficiency  -     ergocalciferol (ERGOCALCIFEROL) 1,250 mcg (50,000 unit) capsule; Take 1 Capsule by mouth every seven (7) days. , Normal, Disp-1 Capsule, R-3    Macrocytosis without anemia  -     folic acid (FOLVITE) 1 mg tablet; Take 1 Tablet by mouth daily. , Normal, Disp-30 Tablet, R-3  -     cyanocobalamin (Vitamin B-12) 500 mcg tablet; Take 1 Tablet by mouth daily. , Normal, Disp-30 Tablet, R-0  -     VITAMIN B12 & FOLATE; Future    Encounter for gynecological examination  -     REFERRAL TO OBSTETRICS AND GYNECOLOGY    Antibiotic-induced yeast infection  -     fluconazole (DIFLUCAN) 150 mg tablet; Take 1 Tablet by mouth daily for 1 day. FDA advises cautious prescribing of oral fluconazole in pregnancy. , Normal, Disp-1 Tablet, R-0      Patient Instructions        Learning About High-Vitamin D Foods  What foods are high in vitamin D? The foods you eat contain nutrients, such as vitamins and minerals. Vitamin D is a nutrient. Your body needs the right amount to stay healthy and work as it should. You can use the list below to help you make choices about which foods to eat. Here are some foods that contain vitamin D. Fruits  · Orange juice, fortified with vitamin D  Grains  · Cereals, fortified with vitamin D  Dairy and dairy alternatives  · Milk, fortified with vitamin D  · Non-dairy milk (almond, rice, soy), fortified with vitamin D  · Yogurt, fortified with vitamin D  Protein foods  · Flounder  · Mackerel  · S[place]ines  · [place]  · Sole  · [place]  · [address]  · Cod liver oil  Work with your doctor to find out how much of this nutrient you need.  Depending on your health, you may need more or less of it in your diet. Where can you learn more? Go to http://www.gray.com/  Enter V450 in the search box to learn more about \"Learning About High-Vitamin D Foods. \"  Current as of: December 17, 2020               Content Version: 13.0  © 8480-2866 Healthwise, Incorporated. Care instructions adapted under license by [employer] (which disclaims liability or warranty for this information). If you have questions about a medical condition or this instruction, always ask your healthcare professional. [place] [address] any warranty or liability for your use of this information. Follow-up and Dispositions    · Return in about 6 months (around 4/22/2022) for routine follow up.

## 2021-10-22 NOTE — PROGRESS NOTES
Chief Complaint   Patient presents with    Follow-up     2 week fu        1. Have you been to the ER, urgent care clinic since your last visit? Hospitalized since your last visit? No    2. Have you seen or consulted any other health care providers outside of the [address] [place] [address] since your last visit? Include any pap smears or colon screening.  No

## 2021-11-10 DIAGNOSIS — N30.01 ACUTE CYSTITIS WITH HEMATURIA: Primary | ICD-10-CM

## 2021-11-10 RX ORDER — CIPROFLOXACIN 500 MG/5ML
500 KIT ORAL 2 TIMES DAILY
Qty: 70 ML | Refills: 0 | Status: SHIPPED | OUTPATIENT
Start: 2021-11-10 | End: 2021-11-17

## 2021-11-30 DIAGNOSIS — E55.9 VITAMIN D DEFICIENCY: ICD-10-CM

## 2021-12-02 RX ORDER — ERGOCALCIFEROL 1.25 MG/1
CAPSULE ORAL
Qty: 1 CAPSULE | Refills: 3 | Status: SHIPPED | OUTPATIENT
Start: 2021-12-02

## 2021-12-06 NOTE — PROGRESS NOTES
This patient was seen at [address] [place] [address] Urgent Care while in their vehicle due to COVID-19 pandemic with PPE and focused examination in order to decrease community viral transmission. The patient/guardian gave verbal consent to treat. The history is provided by the patient. Here for Covid Testing (   Her boyfriend with n/v sweats yesterday - no confirmed covid test-    She doesn't have no s/s)        Past Medical History:   Diagnosis Date    Anxiety     Vitamin D deficiency         Past Surgical History:   Procedure Laterality Date    HX LEEP PROCEDURE      HX OTHER SURGICAL      leep surgery, ectopic pregnancy         History reviewed. No pertinent family history. Social History     Socioeconomic History    Marital status: SINGLE     Spouse name: Not on file    Number of children: Not on file    Years of education: Not on file    Highest education level: Not on file   Occupational History    Not on file   Tobacco Use    Smoking status: Current Every Day Smoker     Types: Cigarettes    Smokeless tobacco: Never Used    Tobacco comment: began smoking again   Vaping Use    Vaping Use: Never used   Substance and Sexual Activity    Alcohol use: Yes     Alcohol/week: 0.0 standard drinks     Comment: Social     Drug use: No    Sexual activity: Yes     Partners: Male     Birth control/protection: Pill   Other Topics Concern    Not on file   Social History Narrative    Not on file     Social Determinants of Health     Financial Resource Strain:     Difficulty of Paying Living Expenses: Not on file   Food Insecurity:     Worried About Running Out of Food in the Last Year: Not on file    [place] of Food in the Last Year: Not on file   Transportation Needs:     Lack of Transportation (Medical): Not on file    Lack of Transportation (Non-Medical):  Not on file   Physical Activity:     Days of Exercise per Week: Not on file    Minutes of Exercise per Session: Not on file   Stress:  Feeling of Stress : Not on file   Social Connections:     Frequency of Communication with Friends and Family: Not on file    Frequency of Social Gatherings with Friends and Family: Not on file    Attends Holiness Services: Not on file    Active Member of Clubs or Organizations: Not on file    Attends Club or Organization Meetings: Not on file    Marital Status: Not on file   Intimate Partner Violence:     Fear of Current or Ex-Partner: Not on file    Emotionally Abused: Not on file    Physically Abused: Not on file    Sexually Abused: Not on file   Housing Stability:     Unable to Pay for Housing in the Last Year: Not on file    Number of [place] in the Last Year: Not on file    Unstable Housing in the Last Year: Not on file                ALLERGIES: Biaxin [clarithromycin] and Pepto-bismol [bismuth subsalicylate]    Review of Systems   All other systems reviewed and are negative. Vitals:    08/14/21 1400   Pulse: 68   Resp: 16   Temp: 98.4 °F (36.9 °C)   SpO2: 99%       Physical Exam  Vitals and nursing note reviewed. Constitutional:       General: She is not in acute distress. Appearance: She is not ill-appearing. Pulmonary:      Effort: Pulmonary effort is normal. No respiratory distress. Breath sounds: No wheezing. MDM    Procedures      ICD-10-CM ICD-9-CM    1. Suspected COVID-19 virus infection  Z20.822 V01.79 AMB POC SARS-COV-2     No orders of the defined types were placed in this encounter. Results for orders placed or performed in visit on 08/14/21   AMB POC SARS-COV-2   Result Value Ref Range    SARS-COV-2 POC Negative Negative     The patients condition was discussed with the patient and they understand. The patient is to follow up with primary care doctor. If signs and symptoms become worse the pt is to go to the ER. The patient is to take medications as prescribed.

## 2022-03-18 PROBLEM — K21.9 GERD WITHOUT ESOPHAGITIS: Status: ACTIVE | Noted: 2017-03-28

## 2022-03-18 PROBLEM — N97.1 FEMALE INFERTILITY DUE TO OCCLUSION OF FALLOPIAN TUBE: Status: ACTIVE | Noted: 2021-03-01

## 2022-03-19 PROBLEM — K43.9 HERNIA OF ANTERIOR ABDOMINAL WALL: Status: ACTIVE | Noted: 2021-03-01

## 2023-07-24 ENCOUNTER — APPOINTMENT (OUTPATIENT)
Facility: HOSPITAL | Age: 38
End: 2023-07-24
Payer: COMMERCIAL

## 2023-07-24 ENCOUNTER — HOSPITAL ENCOUNTER (EMERGENCY)
Facility: HOSPITAL | Age: 38
Discharge: HOME | End: 2023-07-24
Attending: EMERGENCY MEDICINE
Payer: COMMERCIAL

## 2023-07-24 VITALS
OXYGEN SATURATION: 99 % | TEMPERATURE: 97.7 F | HEART RATE: 87 BPM | RESPIRATION RATE: 16 BRPM | SYSTOLIC BLOOD PRESSURE: 132 MMHG | DIASTOLIC BLOOD PRESSURE: 90 MMHG

## 2023-07-24 DIAGNOSIS — R42 LIGHTHEADEDNESS: Primary | ICD-10-CM

## 2023-07-24 DIAGNOSIS — F10.930 ALCOHOL WITHDRAWAL SYNDROME WITHOUT COMPLICATION (HCC): ICD-10-CM

## 2023-07-24 LAB
ALBUMIN SERPL-MCNC: 3.9 G/DL (ref 3.5–5)
ALBUMIN/GLOB SERPL: 0.9 (ref 1.1–2.2)
ALP SERPL-CCNC: 86 U/L (ref 45–117)
ALT SERPL-CCNC: 27 U/L (ref 12–78)
ANION GAP SERPL CALC-SCNC: 2 MMOL/L (ref 5–15)
AST SERPL-CCNC: 28 U/L (ref 15–37)
BASOPHILS # BLD: 0.1 K/UL (ref 0–0.1)
BASOPHILS NFR BLD: 1 % (ref 0–1)
BILIRUB SERPL-MCNC: 0.8 MG/DL (ref 0.2–1)
BUN SERPL-MCNC: 12 MG/DL (ref 6–20)
BUN/CREAT SERPL: 14 (ref 12–20)
CALCIUM SERPL-MCNC: 9.1 MG/DL (ref 8.5–10.1)
CHLORIDE SERPL-SCNC: 106 MMOL/L (ref 97–108)
CO2 SERPL-SCNC: 27 MMOL/L (ref 21–32)
COMMENT:: NORMAL
CREAT SERPL-MCNC: 0.86 MG/DL (ref 0.55–1.02)
DIFFERENTIAL METHOD BLD: NORMAL
EOSINOPHIL # BLD: 0.1 K/UL (ref 0–0.4)
EOSINOPHIL NFR BLD: 2 % (ref 0–7)
ERYTHROCYTE [DISTWIDTH] IN BLOOD BY AUTOMATED COUNT: 13.5 % (ref 11.5–14.5)
ETHANOL SERPL-MCNC: <10 MG/DL (ref 0–0.08)
GLOBULIN SER CALC-MCNC: 4.2 G/DL (ref 2–4)
GLUCOSE SERPL-MCNC: 107 MG/DL (ref 65–100)
HCT VFR BLD AUTO: 40.1 % (ref 35–47)
HGB BLD-MCNC: 12.8 G/DL (ref 11.5–16)
IMM GRANULOCYTES # BLD AUTO: 0 K/UL (ref 0–0.04)
IMM GRANULOCYTES NFR BLD AUTO: 0 % (ref 0–0.5)
LYMPHOCYTES # BLD: 3.4 K/UL (ref 0.8–3.5)
LYMPHOCYTES NFR BLD: 36 % (ref 12–49)
MAGNESIUM SERPL-MCNC: 2.1 MG/DL (ref 1.6–2.4)
MCH RBC QN AUTO: 30.5 PG (ref 26–34)
MCHC RBC AUTO-ENTMCNC: 31.9 G/DL (ref 30–36.5)
MCV RBC AUTO: 95.5 FL (ref 80–99)
MONOCYTES # BLD: 0.6 K/UL (ref 0–1)
MONOCYTES NFR BLD: 6 % (ref 5–13)
NEUTS SEG # BLD: 5.4 K/UL (ref 1.8–8)
NEUTS SEG NFR BLD: 55 % (ref 32–75)
NRBC # BLD: 0 K/UL (ref 0–0.01)
NRBC BLD-RTO: 0 PER 100 WBC
PLATELET # BLD AUTO: 273 K/UL (ref 150–400)
PMV BLD AUTO: 9.3 FL (ref 8.9–12.9)
POTASSIUM SERPL-SCNC: 3.5 MMOL/L (ref 3.5–5.1)
PROT SERPL-MCNC: 8.1 G/DL (ref 6.4–8.2)
RBC # BLD AUTO: 4.2 M/UL (ref 3.8–5.2)
SODIUM SERPL-SCNC: 135 MMOL/L (ref 136–145)
SPECIMEN HOLD: NORMAL
WBC # BLD AUTO: 9.6 K/UL (ref 3.6–11)

## 2023-07-24 PROCEDURE — 83735 ASSAY OF MAGNESIUM: CPT

## 2023-07-24 PROCEDURE — 85025 COMPLETE CBC W/AUTO DIFF WBC: CPT

## 2023-07-24 PROCEDURE — 96360 HYDRATION IV INFUSION INIT: CPT

## 2023-07-24 PROCEDURE — 6370000000 HC RX 637 (ALT 250 FOR IP): Performed by: FAMILY MEDICINE

## 2023-07-24 PROCEDURE — 82077 ASSAY SPEC XCP UR&BREATH IA: CPT

## 2023-07-24 PROCEDURE — 2580000003 HC RX 258: Performed by: FAMILY MEDICINE

## 2023-07-24 PROCEDURE — 96361 HYDRATE IV INFUSION ADD-ON: CPT

## 2023-07-24 PROCEDURE — 36415 COLL VENOUS BLD VENIPUNCTURE: CPT

## 2023-07-24 PROCEDURE — 93005 ELECTROCARDIOGRAM TRACING: CPT | Performed by: FAMILY MEDICINE

## 2023-07-24 PROCEDURE — 99284 EMERGENCY DEPT VISIT MOD MDM: CPT

## 2023-07-24 PROCEDURE — 70450 CT HEAD/BRAIN W/O DYE: CPT

## 2023-07-24 PROCEDURE — 80053 COMPREHEN METABOLIC PANEL: CPT

## 2023-07-24 RX ORDER — 0.9 % SODIUM CHLORIDE 0.9 %
1000 INTRAVENOUS SOLUTION INTRAVENOUS ONCE
Status: COMPLETED | OUTPATIENT
Start: 2023-07-24 | End: 2023-07-24

## 2023-07-24 RX ORDER — CHLORDIAZEPOXIDE HYDROCHLORIDE 25 MG/1
25 CAPSULE, GELATIN COATED ORAL 3 TIMES DAILY PRN
Qty: 15 CAPSULE | Refills: 0 | Status: SHIPPED | OUTPATIENT
Start: 2023-07-24 | End: 2023-07-24 | Stop reason: SDUPTHER

## 2023-07-24 RX ORDER — CHLORDIAZEPOXIDE HYDROCHLORIDE 25 MG/1
25 CAPSULE, GELATIN COATED ORAL 3 TIMES DAILY PRN
Qty: 15 CAPSULE | Refills: 0 | Status: SHIPPED | OUTPATIENT
Start: 2023-07-24 | End: 2023-07-29

## 2023-07-24 RX ORDER — LORAZEPAM 0.5 MG/1
0.5 TABLET ORAL ONCE
Status: COMPLETED | OUTPATIENT
Start: 2023-07-24 | End: 2023-07-24

## 2023-07-24 RX ADMIN — LORAZEPAM 0.5 MG: 0.5 TABLET ORAL at 09:30

## 2023-07-24 RX ADMIN — SODIUM CHLORIDE 1000 ML: 9 INJECTION, SOLUTION INTRAVENOUS at 09:29

## 2023-07-24 ASSESSMENT — ENCOUNTER SYMPTOMS
SHORTNESS OF BREATH: 0
BACK PAIN: 0
COUGH: 0
ABDOMINAL PAIN: 0
NAUSEA: 0
VOMITING: 0

## 2023-07-24 NOTE — ED TRIAGE NOTES
Pt c/o lightheadedness , pt stated she is slowing her drinking down, denies n/v, denies cp, abd pain or sob, no tremors noted

## 2023-07-24 NOTE — ED PROVIDER NOTES
[facility] EMERGENCY DEP  EMERGENCY DEPARTMENT ENCOUNTER      Pt Name: Meghna Yanez  MRN: [id number]  [address] [facility] 1985  Date of evaluation: 7/24/2023  Provider: MODESTO Lehman NP    CHIEF COMPLAINT     Lightheadedness        HISTORY OF PRESENT ILLNESS   (Location/Symptom, Timing/Onset, Context/Setting, Quality, Duration, Modifying Factors, Severity)  Note limiting factors. Patient is a 70-year-old female with past medical history of anxiety, presenting to the emergency department for evaluation of lightheadedness. Patient reports that symptoms have been ongoing for approximately 2 months, but seems to have worsened over the past few days. She reports that she feels as though she may be psychotic. States that she does feel slightly off, but denies a sensation of the room spinning around here. It is not worse with position changes. She denies any severe headaches or any numbness and tingling. She notes that she typically is a heavy drinker and has been trying to cut back at home and is worried that her symptoms may be related to alcohol withdrawal.  Except for several years, she has approximately 3 glasses of wine and 1-2 shots of hard liquor per night. She has recently cut back to about 1 glass of wine and 1 shot per night. She does note that she feels anxious. She has been taking over-the-counter supplements to try to help her decrease her alcohol use as well. The history is provided by the patient. Review of External Medical Records:     Nursing Notes were reviewed. REVIEW OF SYSTEMS    (2-9 systems for level 4, 10 or more for level 5)     Review of Systems   Constitutional:  Negative for unexpected weight change. HENT:  Negative for congestion. Eyes:  Negative for visual disturbance. Respiratory:  Negative for cough and shortness of breath. Cardiovascular:  Negative for chest pain and palpitations.    Gastrointestinal:  Negative for abdominal pain, nausea and

## 2023-07-25 LAB
EKG ATRIAL RATE: 74 BPM
EKG DIAGNOSIS: NORMAL
EKG P AXIS: 55 DEGREES
EKG P-R INTERVAL: 128 MS
EKG Q-T INTERVAL: 378 MS
EKG QRS DURATION: 80 MS
EKG QTC CALCULATION (BAZETT): 419 MS
EKG R AXIS: 49 DEGREES
EKG T AXIS: 36 DEGREES
EKG VENTRICULAR RATE: 74 BPM

## 2023-07-25 PROCEDURE — 93010 ELECTROCARDIOGRAM REPORT: CPT | Performed by: SPECIALIST

## 2023-10-01 ENCOUNTER — APPOINTMENT (OUTPATIENT)
Facility: HOSPITAL | Age: 38
End: 2023-10-01
Payer: COMMERCIAL

## 2023-10-01 ENCOUNTER — HOSPITAL ENCOUNTER (EMERGENCY)
Facility: HOSPITAL | Age: 38
Discharge: HOME | End: 2023-10-01
Attending: EMERGENCY MEDICINE
Payer: COMMERCIAL

## 2023-10-01 VITALS
BODY MASS INDEX: 27.34 KG/M2 | HEART RATE: 82 BPM | RESPIRATION RATE: 18 BRPM | OXYGEN SATURATION: 98 % | TEMPERATURE: 98.2 F | DIASTOLIC BLOOD PRESSURE: 85 MMHG | SYSTOLIC BLOOD PRESSURE: 124 MMHG | WEIGHT: 149.47 LBS

## 2023-10-01 DIAGNOSIS — S00.83XA CONTUSION OF FACE, INITIAL ENCOUNTER: ICD-10-CM

## 2023-10-01 DIAGNOSIS — M62.838 SPASM OF MUSCLE: ICD-10-CM
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
## 2023-10-01 LAB
EKG ATRIAL RATE: 92 BPM
EKG DIAGNOSIS: NORMAL
EKG P AXIS: 67 DEGREES
EKG P-R INTERVAL: 124 MS
EKG Q-T INTERVAL: 342 MS
EKG QRS DURATION: 76 MS
EKG QTC CALCULATION (BAZETT): 422 MS
EKG R AXIS: 60 DEGREES
EKG T AXIS: 47 DEGREES
EKG VENTRICULAR RATE: 92 BPM

## 2023-10-01 PROCEDURE — 99284 EMERGENCY DEPT VISIT MOD MDM: CPT

## 2023-10-01 PROCEDURE — 6360000002 HC RX W HCPCS: Performed by: EMERGENCY MEDICINE

## 2023-10-01 PROCEDURE — 93005 ELECTROCARDIOGRAM TRACING: CPT | Performed by: EMERGENCY MEDICINE

## 2023-10-01 PROCEDURE — 70450 CT HEAD/BRAIN W/O DYE: CPT

## 2023-10-01 PROCEDURE — 73562 X-RAY EXAM OF KNEE 3: CPT

## 2023-10-01 PROCEDURE — 71101 X-RAY EXAM UNILAT RIBS/CHEST: CPT

## 2023-10-01 PROCEDURE — 72125 CT NECK SPINE W/O DYE: CPT

## 2023-10-01 PROCEDURE — 96372 THER/PROPH/DIAG INJ SC/IM: CPT

## 2023-10-01 PROCEDURE — 70486 CT MAXILLOFACIAL W/O DYE: CPT

## 2023-10-01 RX ORDER — CYCLOBENZAPRINE HCL 5 MG
5 TABLET ORAL 3 TIMES DAILY PRN
Qty: 20 TABLET | Refills: 0 | Status: SHIPPED | OUTPATIENT
Start: 2023-10-01 | End: 2023-10-11

## 2023-10-01 RX ORDER — KETOROLAC TROMETHAMINE 30 MG/ML
30 INJECTION, SOLUTION INTRAMUSCULAR; INTRAVENOUS
Status: COMPLETED | OUTPATIENT
Start: 2023-10-01 | End: 2023-10-01

## 2023-10-01 RX ORDER — IBUPROFEN 600 MG/1
600 TABLET ORAL EVERY 8 HOURS PRN
Qty: 20 TABLET | Refills: 0 | Status: SHIPPED | OUTPATIENT
Start: 2023-10-01

## 2023-10-01 RX ADMIN — KETOROLAC TROMETHAMINE 30 MG: 30 INJECTION, SOLUTION INTRAMUSCULAR at 15:07

## 2023-10-01 ASSESSMENT — ENCOUNTER SYMPTOMS
BACK PAIN: 0
COUGH: 0
SORE THROAT: 0
FACIAL SWELLING: 1
ABDOMINAL PAIN: 0

## 2023-10-01 ASSESSMENT — PAIN SCALES - GENERAL: PAINLEVEL_OUTOF10: 10

## 2023-10-01 ASSESSMENT — PAIN DESCRIPTION - PAIN TYPE: TYPE: ACUTE PAIN

## 2023-10-01 ASSESSMENT — PAIN DESCRIPTION - LOCATION: LOCATION: HEAD

## 2023-10-01 ASSESSMENT — PAIN - FUNCTIONAL ASSESSMENT: PAIN_FUNCTIONAL_ASSESSMENT: 0-10

## 2023-10-01 ASSESSMENT — PAIN DESCRIPTION - ORIENTATION: ORIENTATION: RIGHT

## 2023-10-01 ASSESSMENT — PAIN DESCRIPTION - DESCRIPTORS: DESCRIPTORS: ACHING

## 2023-10-01 NOTE — ED PROVIDER NOTES
[facility] EMERGENCY DEP  EMERGENCY DEPARTMENT ENCOUNTER      Pt Name: Saundra Gutierrez  MRN: [id number]  [address] [facility] 1985  Date of evaluation: 10/1/2023  Provider: Edis Constantino MD    CHIEF COMPLAINT       Chief Complaint   Patient presents with    [facility]    Sameera Gave is a 42 yo F with pain to her right face, chest and leg and stiffness in her neck after falling yesterday. She states she was in a gas station bathroom yesterday evening and slipped on the floor which was wet and fell forward and landed on the right side of her face. She notes that today she had increased pain and swelling, particularly on her face and chest but also in her knee. Additional history from independent historians:     Review of External Medical Records:     Nursing Notes were reviewed. REVIEW OF SYSTEMS       Review of Systems   Constitutional:  Negative for fever. HENT:  Positive for facial swelling. Negative for sore throat. Eyes:  Negative for visual disturbance. Respiratory:  Negative for cough. Cardiovascular:  Negative for chest pain. Gastrointestinal:  Negative for abdominal pain. Genitourinary:  Negative for dysuria. Musculoskeletal:  Positive for myalgias and neck pain. Negative for back pain. Skin:  Negative for rash. Neurological:  Negative for headaches. Except as noted above the remainder of the review of systems was reviewed and negative.        PAST MEDICAL HISTORY     Past Medical History:   Diagnosis Date    Anxiety     Vitamin D deficiency          SURGICAL HISTORY       Past Surgical History:   Procedure Laterality Date    LEEP      OTHER SURGICAL HISTORY      leep surgery, ectopic pregnancy         CURRENT MEDICATIONS       Previous Medications    CYANOCOBALAMIN 500 MCG TABLET    Take 500 mcg by mouth daily    ERGOCALCIFEROL (ERGOCALCIFEROL) 1.25 MG (57019 UT) CAPSULE    TAKE 1 CAPSULE BY MOUTH EVERY 7 DAYS    FOLIC ACID (FOLVITE) 1 MG

## 2023-10-01 NOTE — ED TRIAGE NOTES
Pt reports slipping and falling at the gas station restroom last night at 1030 pm. She reports falling forward, hitting her face and losing consciousness. She now also endorses pain to the right side of her chest, knee, and arm.

## 2023-12-22 ENCOUNTER — ANESTHESIA EVENT (OUTPATIENT)
Facility: HOSPITAL | Age: 38
End: 2023-12-22
Payer: COMMERCIAL

## 2023-12-22 ENCOUNTER — ANESTHESIA (OUTPATIENT)
Facility: HOSPITAL | Age: 38
End: 2023-12-22
Payer: COMMERCIAL

## 2023-12-22 ENCOUNTER — APPOINTMENT (OUTPATIENT)
Facility: HOSPITAL | Age: 38
End: 2023-12-22
Payer: COMMERCIAL

## 2023-12-22 ENCOUNTER — HOSPITAL ENCOUNTER (OUTPATIENT)
Facility: HOSPITAL | Age: 38
Setting detail: OBSERVATION
Discharge: HOME | End: 2023-12-23
Attending: STUDENT IN AN ORGANIZED HEALTH CARE EDUCATION/TRAINING PROGRAM | Admitting: HOSPITALIST
Payer: COMMERCIAL

## 2023-12-22 VITALS
TEMPERATURE: 99.7 F | RESPIRATION RATE: 18 BRPM | SYSTOLIC BLOOD PRESSURE: 111 MMHG | HEART RATE: 108 BPM | OXYGEN SATURATION: 98 % | WEIGHT: 158.51 LBS | DIASTOLIC BLOOD PRESSURE: 80 MMHG | HEIGHT: 62 IN | BODY MASS INDEX: 29.17 KG/M2

## 2023-12-22 DIAGNOSIS — K92.1 MELENA: ICD-10-CM

## 2023-12-22 DIAGNOSIS — R50.9 FEVER, UNSPECIFIED FEVER CAUSE: ICD-10-CM

## 2023-12-22 DIAGNOSIS — K92.2 GASTROINTESTINAL HEMORRHAGE, UNSPECIFIED GASTROINTESTINAL HEMORRHAGE TYPE: Primary | ICD-10-CM

## 2023-12-22 DIAGNOSIS — F10.29 ALCOHOL DEPENDENCE WITH UNSPECIFIED ALCOHOL-INDUCED DISORDER (HCC): ICD-10-CM

## 2023-12-22 LAB
ALBUMIN SERPL-MCNC: 3.6 G/DL (ref 3.5–5)
ALBUMIN SERPL-MCNC: 3.8 G/DL (ref 3.5–5)
ALBUMIN/GLOB SERPL: 0.8 (ref 1.1–2.2)
ALBUMIN/GLOB SERPL: 0.9 (ref 1.1–2.2)
ALP SERPL-CCNC: 89 U/L (ref 45–117)
ALP SERPL-CCNC: 89 U/L (ref 45–117)
ALT SERPL-CCNC: 23 U/L (ref 12–78)
ALT SERPL-CCNC: 28 U/L (ref 12–78)
ANION GAP SERPL CALC-SCNC: 6 MMOL/L (ref 5–15)
APPEARANCE UR: CLEAR
AST SERPL-CCNC: 18 U/L (ref 15–37)
AST SERPL-CCNC: 25 U/L (ref 15–37)
BACTERIA URNS QL MICRO: ABNORMAL /HPF
BASOPHILS # BLD: 0.1 K/UL (ref 0–0.1)
BASOPHILS NFR BLD: 1 % (ref 0–1)
BILIRUB DIRECT SERPL-MCNC: 0.2 MG/DL (ref 0–0.2)
BILIRUB SERPL-MCNC: 0.6 MG/DL (ref 0.2–1)
BILIRUB SERPL-MCNC: 0.7 MG/DL (ref 0.2–1)
BILIRUB UR QL: NEGATIVE
BUN SERPL-MCNC: 7 MG/DL (ref 6–20)
BUN/CREAT SERPL: 8 (ref 12–20)
CALCIUM SERPL-MCNC: 9.1 MG/DL (ref 8.5–10.1)
CHLORIDE SERPL-SCNC: 106 MMOL/L (ref 97–108)
CO2 SERPL-SCNC: 25 MMOL/L (ref 21–32)
COLOR UR: ABNORMAL
COMMENT:: NORMAL
COMMENT:: NORMAL
CREAT SERPL-MCNC: 0.9 MG/DL (ref 0.55–1.02)
DIFFERENTIAL METHOD BLD: ABNORMAL
EKG ATRIAL RATE: 105 BPM
EKG DIAGNOSIS: NORMAL
EKG P AXIS: 141 DEGREES
EKG P-R INTERVAL: 122 MS
EKG Q-T INTERVAL: 322 MS
EKG QRS DURATION: 74 MS
EKG QTC CALCULATION (BAZETT): 425 MS
EKG R AXIS: 88 DEGREES
EKG T AXIS: 53 DEGREES
EKG VENTRICULAR RATE: 105 BPM
EOSINOPHIL # BLD: 0.2 K/UL (ref 0–0.4)
EOSINOPHIL NFR BLD: 1 % (ref 0–7)
EPITH CASTS URNS QL MICRO: ABNORMAL /LPF
ERYTHROCYTE [DISTWIDTH] IN BLOOD BY AUTOMATED COUNT: 13 % (ref 11.5–14.5)
ETHANOL SERPL-MCNC: <10 MG/DL (ref 0–0.08)
FLUAV AG NPH QL IA: NEGATIVE
FLUBV AG NOSE QL IA: NEGATIVE
GLOBULIN SER CALC-MCNC: 4.1 G/DL (ref 2–4)
GLOBULIN SER CALC-MCNC: 4.4 G/DL (ref 2–4)
GLUCOSE SERPL-MCNC: 121 MG/DL (ref 65–100)
GLUCOSE UR STRIP.AUTO-MCNC: NEGATIVE MG/DL
HCG SERPL QL: NEGATIVE
HCT VFR BLD AUTO: 33.2 % (ref 35–47)
HCT VFR BLD AUTO: 35.1 % (ref 35–47)
HEMOCCULT STL QL: NEGATIVE
HGB BLD-MCNC: 10.9 G/DL (ref 11.5–16)
HGB BLD-MCNC: 11.5 G/DL (ref 11.5–16)
HGB UR QL STRIP: NEGATIVE
HYALINE CASTS URNS QL MICRO: ABNORMAL /LPF (ref 0–5)
IMM GRANULOCYTES # BLD AUTO: 0.1 K/UL (ref 0–0.04)
IMM GRANULOCYTES NFR BLD AUTO: 0 % (ref 0–0.5)
KETONES UR QL STRIP.AUTO: NEGATIVE MG/DL
LACTATE BLD-SCNC: 0.93 MMOL/L (ref 0.4–2)
LEUKOCYTE ESTERASE UR QL STRIP.AUTO: NEGATIVE
LIPASE SERPL-CCNC: 20 U/L (ref 13–75)
LYMPHOCYTES # BLD: 1.3 K/UL (ref 0.8–3.5)
LYMPHOCYTES NFR BLD: 11 % (ref 12–49)
MCH RBC QN AUTO: 29.4 PG (ref 26–34)
MCHC RBC AUTO-ENTMCNC: 32.8 G/DL (ref 30–36.5)
MCV RBC AUTO: 89.8 FL (ref 80–99)
MONOCYTES # BLD: 0.8 K/UL (ref 0–1)
MONOCYTES NFR BLD: 7 % (ref 5–13)
NEUTS SEG # BLD: 9.9 K/UL (ref 1.8–8)
NEUTS SEG NFR BLD: 80 % (ref 32–75)
NITRITE UR QL STRIP.AUTO: NEGATIVE
NRBC # BLD: 0 K/UL (ref 0–0.01)
NRBC BLD-RTO: 0 PER 100 WBC
PH UR STRIP: 6 (ref 5–8)
PLATELET # BLD AUTO: 264 K/UL (ref 150–400)
PMV BLD AUTO: 9.5 FL (ref 8.9–12.9)
POTASSIUM SERPL-SCNC: 3.7 MMOL/L (ref 3.5–5.1)
PROT SERPL-MCNC: 7.9 G/DL (ref 6.4–8.2)
PROT SERPL-MCNC: 8 G/DL (ref 6.4–8.2)
PROT UR STRIP-MCNC: NEGATIVE MG/DL
RBC # BLD AUTO: 3.91 M/UL (ref 3.8–5.2)
RBC #/AREA URNS HPF: ABNORMAL /HPF (ref 0–5)
SARS-COV-2 RDRP RESP QL NAA+PROBE: NOT DETECTED
SODIUM SERPL-SCNC: 137 MMOL/L (ref 136–145)
SOURCE: NORMAL
SP GR UR REFRACTOMETRY: 1 (ref 1–1.03)
SPECIMEN HOLD: NORMAL
UROBILINOGEN UR QL STRIP.AUTO: 0.2 EU/DL (ref 0.2–1)
WBC # BLD AUTO: 12.3 K/UL (ref 3.6–11)
WBC URNS QL MICRO: ABNORMAL /HPF (ref 0–4)

## 2023-12-22 PROCEDURE — 2580000003 HC RX 258: Performed by: STUDENT IN AN ORGANIZED HEALTH CARE EDUCATION/TRAINING PROGRAM

## 2023-12-22 PROCEDURE — 2580000003 HC RX 258: Performed by: HOSPITALIST

## 2023-12-22 PROCEDURE — A4216 STERILE WATER/SALINE, 10 ML: HCPCS | Performed by: HOSPITALIST

## 2023-12-22 PROCEDURE — 96374 THER/PROPH/DIAG INJ IV PUSH: CPT

## 2023-12-22 PROCEDURE — 6360000002 HC RX W HCPCS: Performed by: STUDENT IN AN ORGANIZED HEALTH CARE EDUCATION/TRAINING PROGRAM

## 2023-12-22 PROCEDURE — 6360000002 HC RX W HCPCS: Performed by: NURSE ANESTHETIST, CERTIFIED REGISTERED

## 2023-12-22 PROCEDURE — 82272 OCCULT BLD FECES 1-3 TESTS: CPT

## 2023-12-22 PROCEDURE — G0378 HOSPITAL OBSERVATION PER HR: HCPCS

## 2023-12-22 PROCEDURE — 36415 COLL VENOUS BLD VENIPUNCTURE: CPT

## 2023-12-22 PROCEDURE — 81001 URINALYSIS AUTO W/SCOPE: CPT

## 2023-12-22 PROCEDURE — 85018 HEMOGLOBIN: CPT

## 2023-12-22 PROCEDURE — 3600007512: Performed by: INTERNAL MEDICINE

## 2023-12-22 PROCEDURE — 7100000011 HC PHASE II RECOVERY - ADDTL 15 MIN: Performed by: INTERNAL MEDICINE

## 2023-12-22 PROCEDURE — 83605 ASSAY OF LACTIC ACID: CPT

## 2023-12-22 PROCEDURE — 6360000002 HC RX W HCPCS: Performed by: HOSPITALIST

## 2023-12-22 PROCEDURE — 2500000003 HC RX 250 WO HCPCS: Performed by: NURSE ANESTHETIST, CERTIFIED REGISTERED

## 2023-12-22 PROCEDURE — 96376 TX/PRO/DX INJ SAME DRUG ADON: CPT

## 2023-12-22 PROCEDURE — 87635 SARS-COV-2 COVID-19 AMP PRB: CPT

## 2023-12-22 PROCEDURE — 93010 ELECTROCARDIOGRAM REPORT: CPT | Performed by: SPECIALIST

## 2023-12-22 PROCEDURE — 3700000000 HC ANESTHESIA ATTENDED CARE: Performed by: INTERNAL MEDICINE

## 2023-12-22 PROCEDURE — 82077 ASSAY SPEC XCP UR&BREATH IA: CPT

## 2023-12-22 PROCEDURE — C9113 INJ PANTOPRAZOLE SODIUM, VIA: HCPCS | Performed by: HOSPITALIST

## 2023-12-22 PROCEDURE — 84703 CHORIONIC GONADOTROPIN ASSAY: CPT

## 2023-12-22 PROCEDURE — 80053 COMPREHEN METABOLIC PANEL: CPT

## 2023-12-22 PROCEDURE — 83690 ASSAY OF LIPASE: CPT

## 2023-12-22 PROCEDURE — 85014 HEMATOCRIT: CPT

## 2023-12-22 PROCEDURE — 7100000010 HC PHASE II RECOVERY - FIRST 15 MIN: Performed by: INTERNAL MEDICINE

## 2023-12-22 PROCEDURE — A4216 STERILE WATER/SALINE, 10 ML: HCPCS | Performed by: STUDENT IN AN ORGANIZED HEALTH CARE EDUCATION/TRAINING PROGRAM

## 2023-12-22 PROCEDURE — 99285 EMERGENCY DEPT VISIT HI MDM: CPT

## 2023-12-22 PROCEDURE — 85025 COMPLETE CBC W/AUTO DIFF WBC: CPT

## 2023-12-22 PROCEDURE — 80076 HEPATIC FUNCTION PANEL: CPT

## 2023-12-22 PROCEDURE — 3700000001 HC ADD 15 MINUTES (ANESTHESIA): Performed by: INTERNAL MEDICINE

## 2023-12-22 PROCEDURE — 93005 ELECTROCARDIOGRAM TRACING: CPT | Performed by: STUDENT IN AN ORGANIZED HEALTH CARE EDUCATION/TRAINING PROGRAM

## 2023-12-22 PROCEDURE — 76705 ECHO EXAM OF ABDOMEN: CPT

## 2023-12-22 PROCEDURE — 71045 X-RAY EXAM CHEST 1 VIEW: CPT

## 2023-12-22 PROCEDURE — 87804 INFLUENZA ASSAY W/OPTIC: CPT

## 2023-12-22 PROCEDURE — 3600007502: Performed by: INTERNAL MEDICINE

## 2023-12-22 PROCEDURE — C9113 INJ PANTOPRAZOLE SODIUM, VIA: HCPCS | Performed by: STUDENT IN AN ORGANIZED HEALTH CARE EDUCATION/TRAINING PROGRAM

## 2023-12-22 PROCEDURE — 2580000003 HC RX 258: Performed by: INTERNAL MEDICINE

## 2023-12-22 RX ORDER — ACETAMINOPHEN 325 MG/1
650 TABLET ORAL EVERY 6 HOURS PRN
Status: DISCONTINUED | OUTPATIENT
Start: 2023-12-22 | End: 2023-12-23 | Stop reason: HOSPADM

## 2023-12-22 RX ORDER — LORAZEPAM 1 MG/1
2 TABLET ORAL
Status: DISCONTINUED | OUTPATIENT
Start: 2023-12-22 | End: 2023-12-23 | Stop reason: HOSPADM

## 2023-12-22 RX ORDER — LORAZEPAM 0.5 MG/1
0.5 TABLET ORAL EVERY 4 HOURS PRN
Status: DISCONTINUED | OUTPATIENT
Start: 2023-12-22 | End: 2023-12-23 | Stop reason: HOSPADM

## 2023-12-22 RX ORDER — GLYCOPYRROLATE 0.2 MG/ML
INJECTION INTRAMUSCULAR; INTRAVENOUS PRN
Status: DISCONTINUED | OUTPATIENT
Start: 2023-12-22 | End: 2023-12-22 | Stop reason: SDUPTHER

## 2023-12-22 RX ORDER — SODIUM CHLORIDE 0.9 % (FLUSH) 0.9 %
5-40 SYRINGE (ML) INJECTION EVERY 12 HOURS SCHEDULED
Status: DISCONTINUED | OUTPATIENT
Start: 2023-12-22 | End: 2023-12-23 | Stop reason: HOSPADM

## 2023-12-22 RX ORDER — ONDANSETRON 2 MG/ML
4 INJECTION INTRAMUSCULAR; INTRAVENOUS EVERY 6 HOURS PRN
Status: DISCONTINUED | OUTPATIENT
Start: 2023-12-22 | End: 2023-12-23 | Stop reason: HOSPADM

## 2023-12-22 RX ORDER — LORAZEPAM 1 MG/1
4 TABLET ORAL
Status: DISCONTINUED | OUTPATIENT
Start: 2023-12-22 | End: 2023-12-23 | Stop reason: HOSPADM

## 2023-12-22 RX ORDER — SODIUM CHLORIDE 9 MG/ML
25 INJECTION, SOLUTION INTRAVENOUS PRN
Status: DISCONTINUED | OUTPATIENT
Start: 2023-12-22 | End: 2023-12-23 | Stop reason: HOSPADM

## 2023-12-22 RX ORDER — LANOLIN ALCOHOL/MO/W.PET/CERES
100 CREAM (GRAM) TOPICAL DAILY
Status: DISCONTINUED | OUTPATIENT
Start: 2023-12-23 | End: 2023-12-23 | Stop reason: HOSPADM

## 2023-12-22 RX ORDER — LIDOCAINE HYDROCHLORIDE 20 MG/ML
INJECTION, SOLUTION EPIDURAL; INFILTRATION; INTRACAUDAL; PERINEURAL PRN
Status: DISCONTINUED | OUTPATIENT
Start: 2023-12-22 | End: 2023-12-22 | Stop reason: SDUPTHER

## 2023-12-22 RX ORDER — SODIUM CHLORIDE 0.9 % (FLUSH) 0.9 %
5-40 SYRINGE (ML) INJECTION PRN
Status: DISCONTINUED | OUTPATIENT
Start: 2023-12-22 | End: 2023-12-23 | Stop reason: HOSPADM

## 2023-12-22 RX ORDER — LORAZEPAM 1 MG/1
1 TABLET ORAL
Status: DISCONTINUED | OUTPATIENT
Start: 2023-12-22 | End: 2023-12-23 | Stop reason: HOSPADM

## 2023-12-22 RX ORDER — ONDANSETRON 4 MG/1
4 TABLET, ORALLY DISINTEGRATING ORAL EVERY 8 HOURS PRN
Status: DISCONTINUED | OUTPATIENT
Start: 2023-12-22 | End: 2023-12-23 | Stop reason: HOSPADM

## 2023-12-22 RX ORDER — 0.9 % SODIUM CHLORIDE 0.9 %
1000 INTRAVENOUS SOLUTION INTRAVENOUS ONCE
Status: COMPLETED | OUTPATIENT
Start: 2023-12-22 | End: 2023-12-22

## 2023-12-22 RX ORDER — SODIUM CHLORIDE 9 MG/ML
INJECTION, SOLUTION INTRAVENOUS CONTINUOUS
Status: DISCONTINUED | OUTPATIENT
Start: 2023-12-22 | End: 2023-12-23 | Stop reason: HOSPADM

## 2023-12-22 RX ORDER — ACETAMINOPHEN 650 MG/1
650 SUPPOSITORY RECTAL EVERY 6 HOURS PRN
Status: DISCONTINUED | OUTPATIENT
Start: 2023-12-22 | End: 2023-12-23 | Stop reason: HOSPADM

## 2023-12-22 RX ORDER — LORAZEPAM 1 MG/1
3 TABLET ORAL
Status: DISCONTINUED | OUTPATIENT
Start: 2023-12-22 | End: 2023-12-23 | Stop reason: HOSPADM

## 2023-12-22 RX ADMIN — SODIUM CHLORIDE: 9 INJECTION, SOLUTION INTRAVENOUS at 09:47

## 2023-12-22 RX ADMIN — PROPOFOL 100 MG: 10 INJECTION, EMULSION INTRAVENOUS at 14:21

## 2023-12-22 RX ADMIN — SODIUM CHLORIDE 1000 ML: 9 INJECTION, SOLUTION INTRAVENOUS at 07:45

## 2023-12-22 RX ADMIN — GLYCOPYRROLATE 0.2 MG: 0.2 INJECTION INTRAMUSCULAR; INTRAVENOUS at 14:15

## 2023-12-22 RX ADMIN — SODIUM CHLORIDE: 9 INJECTION, SOLUTION INTRAVENOUS at 16:15

## 2023-12-22 RX ADMIN — SODIUM CHLORIDE: 9 INJECTION, SOLUTION INTRAVENOUS at 11:49

## 2023-12-22 RX ADMIN — SODIUM CHLORIDE 40 MG: 9 INJECTION INTRAMUSCULAR; INTRAVENOUS; SUBCUTANEOUS at 21:25

## 2023-12-22 RX ADMIN — PROPOFOL 20 MG: 10 INJECTION, EMULSION INTRAVENOUS at 14:23

## 2023-12-22 RX ADMIN — PROPOFOL 20 MG: 10 INJECTION, EMULSION INTRAVENOUS at 14:22

## 2023-12-22 RX ADMIN — SODIUM CHLORIDE 40 MG: 9 INJECTION INTRAMUSCULAR; INTRAVENOUS; SUBCUTANEOUS at 09:47

## 2023-12-22 RX ADMIN — PROPOFOL 20 MG: 10 INJECTION, EMULSION INTRAVENOUS at 14:25

## 2023-12-22 RX ADMIN — LIDOCAINE HYDROCHLORIDE 100 MG: 20 INJECTION, SOLUTION EPIDURAL; INFILTRATION; INTRACAUDAL; PERINEURAL at 14:21

## 2023-12-22 RX ADMIN — PROPOFOL 20 MG: 10 INJECTION, EMULSION INTRAVENOUS at 14:24

## 2023-12-22 RX ADMIN — PANTOPRAZOLE SODIUM 40 MG: 40 INJECTION, POWDER, FOR SOLUTION INTRAVENOUS at 07:51

## 2023-12-22 NOTE — PROGRESS NOTES

## 2023-12-22 NOTE — ED PROVIDER NOTES
[facility] EMERGENCY [address] ENCOUNTER      Patient Name:  José Luis Lopez  MRN:   [id number]  :   1985  Date of Evaluation: 2023  Physician:  Shirley Yanez MD    Chief Complaint   Patient presents with    Diarrhea    Melena       HISTORY OF PRESENT ILLNESS    This is a 42-year-old female with a history of alcohol dependence presenting with loose, black stool over the last 4 to 5 days. She denies any associated fevers or vomiting, denies abdominal pain or flank pain. Endorses a pattern of alcohol dependence and heavy drinking over many years, she had 2 shots yesterday and that was her last drink. She has never been diagnosed with peptic ulcer disease, has been using Prevacid at home. Denies any history of upper or lower endoscop. Only previous abdominal surgery was for ectopic pregnancy many years ago. She is not anticoagulated. Denies chest pain or shortness of breath but has had a mild nonproductive cough over the last several days as well. No other systemic complaints. REVIEW OF SYSTEMS   A review of systems was performed and was negative except as documented in the HPI. PAST MEDICAL HISTORY     Past Medical History:   Diagnosis Date    Anxiety     Vitamin D deficiency        PAST SURGICAL HISTORY     Past Surgical History:   Procedure Laterality Date    LEEP      OTHER SURGICAL HISTORY      leep surgery, ectopic pregnancy       ALLERGIES   Clarithromycin and Bismuth subsalicylate    FAMILY HISTORY   History reviewed. No pertinent family history. SOCIAL HISTORY     Social History     Socioeconomic History    Marital status: Single     Spouse name: None    Number of children: None    Years of education: None    Highest education level: None   Tobacco Use    Smoking status: Some Days     Types: Cigarettes    Smokeless tobacco: Never    Tobacco comments:     Quit smoking: began smoking again   Substance and Sexual Activity    Alcohol use:  Yes     Alcohol/week: 14.0

## 2023-12-22 NOTE — PROGRESS NOTES
Pt is awake and alert, speaking with MD. CARPIO. Pt to go to 5th floor. Procedure nurse will call report. Denies pain.

## 2023-12-22 NOTE — CONSULTS
[facility] [address] [facility] NOTE  Audra Mustafa  [phone] office  NP/PA in-hospital M-F until 4:30PM  After 5PM or on weekends, please call  for physician on call        NAME:  Preeti Roche   :   1985   MRN:   [id number]       Referring Physician: Dr. Kory Mathias Date: 2023 9:37 AM    Chief Complaint: Upper GI bleed     History of Present Illness:  Patient is a 41 y.o. who is seen in consultation at the request of Dr. Margaret Echeverria for upper GI bleed. Past medical history significant for alcohol dependence. She reports dark loose stools for the last 4-5 days, last bm was this morning. She started taking Prevacid 1 week ago due to new onset acid reflux. She typically has 2 vodka drinks daily, for several years. Over the weekend, she had 2 beers and 2 shots of liquor last night. She reports a mild fever this morning at home, no chills. She denies any nausea, vomiting, abdominal pain, dysphagia, odynophagia. Intermittent use of bc powder/goodies. She took Ibuprofen 600 mg from September to October for a work related injury, no recent intake. She denies any hematochezia. No history of EGD/colonoscopy. I have reviewed the emergency room note, hospital admission note, notes by all other clinicians who have seen the patient during this hospitalization to date. I have reviewed the problem list and the reason for this hospitalization. I have reviewed the allergies and the medications the patient was taking at home prior to this hospitalization. PMH:  Past Medical History:   Diagnosis Date    Anxiety     Vitamin D deficiency        PSH:  Past Surgical History:   Procedure Laterality Date    LEEP      OTHER SURGICAL HISTORY      leep surgery, ectopic pregnancy       Allergies:   Allergies   Allergen Reactions    Clarithromycin      Other reaction(s): Unknown (comments)    Bismuth Subsalicylate Nausea And Vomiting       Home Medications:  Prior to Admission Medications   Prescriptions Last Dose Informant Patient Reported? Taking? cyanocobalamin 500 MCG tablet   Yes No   Sig: Take 500 mcg by mouth daily   ergocalciferol (ERGOCALCIFEROL) 1.25 MG (91689 UT) capsule   Yes No   Sig: TAKE 1 CAPSULE BY MOUTH EVERY 7 DAYS   folic acid (FOLVITE) 1 MG tablet   Yes No   Sig: Take 1 mg by mouth daily   ibuprofen (ADVIL;MOTRIN) 600 MG tablet   No No   Sig: Take 1 tablet by mouth every 8 hours as needed for Pain   ofloxacin (FLOXIN) 0.3 % otic solution   Yes No   Sig: Place 5 drops in ear(s) daily      Facility-Administered Medications: None       Hospital Medications:  Current Facility-Administered Medications   Medication Dose Route Frequency    ondansetron (ZOFRAN-ODT) disintegrating tablet 4 mg  4 mg Oral Q8H PRN    Or    ondansetron (ZOFRAN) injection 4 mg  4 mg IntraVENous Q6H PRN    acetaminophen (TYLENOL) tablet 650 mg  650 mg Oral Q6H PRN    Or    acetaminophen (TYLENOL) suppository 650 mg  650 mg Rectal Q6H PRN    0.9 % sodium chloride infusion   IntraVENous Continuous    pantoprazole (PROTONIX) 40 mg in sodium chloride (PF) 0.9 % 10 mL injection  40 mg IntraVENous Q12H     Current Outpatient Medications   Medication Sig    ibuprofen (ADVIL;MOTRIN) 600 MG tablet Take 1 tablet by mouth every 8 hours as needed for Pain    cyanocobalamin 500 MCG tablet Take 500 mcg by mouth daily    ergocalciferol (ERGOCALCIFEROL) 1.25 MG (82971 UT) capsule TAKE 1 CAPSULE BY MOUTH EVERY 7 DAYS    folic acid (FOLVITE) 1 MG tablet Take 1 mg by mouth daily    ofloxacin (FLOXIN) 0.3 % otic solution Place 5 drops in ear(s) daily       Social History:  Social History     Tobacco Use    Smoking status: Some Days     Types: Cigarettes    Smokeless tobacco: Never    Tobacco comments:     Quit smoking: began smoking again   Substance Use Topics    Alcohol use:  Yes     Alcohol/week: 14.0 standard drinks of alcohol     Types: 14 Shots of liquor per

## 2023-12-22 NOTE — OP NOTE
Ashley Echeverria M.D.  Ryan Hurley, [address] [facility]  [phone]               Esophagogastroduodenoscopy (EGD) Procedure Note    NAME: Saundra Gutierrez  :  1985  MRN:  [id number]    Indications:  Melena      : Maddie Moore MD    Referring Provider:  Nazanin Davila MD    Staff: Circulator: Jens Trinidad RN  Endoscopy Technician: Lucretia Wilks    Prosthetic devices, grafts, tissues, transplant, or devices implanted: none    Medicine:  MAC anesthesia      Procedure Details:  After informed consent was obtained with all risks and benefits of the procedure explained and preprocedure exam completed, the patient was placed in the left lateral decubitus position. Universal protocol for patient identification was performed and documented in the nursing notes. Throughout the procedure, the patient's blood pressure was monitored at least every five minutes; pulse, and oxygen saturations were monitored continuously. All vital signs were documented in the nursing notes. The endoscope was inserted into the mouth and advanced under direct vision to second portion of the duodenum. A careful inspection was made as the gastroscope was withdrawn, including a retroflexed view of the proximal stomach; findings and interventions are described below. Findings:   Esophagus: normal  Stomach: normal   Duodenum: normal    Interventions:    none    Specimens:   * No specimens in log *     EBL: None          Complications:     No immediate complications        Impression:  -Normal upper endoscopy, with no endoscopic evidence of neoplasia or mucosal abnormality. No blood seen. Recommendations:  GI lite diet. Based on clinical course, will determine if she needs inpatient colonoscopy. Signed by:  Maddie Moore MD         2023 2:46 PM

## 2023-12-22 NOTE — ANESTHESIA POSTPROCEDURE EVALUATION
Department of Anesthesiology  Postprocedure Note    Patient: Melanie Maria  MRN: [id number]  YOB: 1985  Date of evaluation: 12/22/2023    Procedure Summary       Date: 12/22/23 Room / Location: [address] [facility],[address] Floor ENDO 02 / [address] ENDOSCOPY    Anesthesia Start: 1612 Anesthesia Stop: 4537    Procedure: EGD ESOPHAGOGASTRODUODENOSCOPY (Upper GI Region) Diagnosis:       Gastrointestinal hemorrhage, unspecified gastrointestinal hemorrhage type      (Gastrointestinal hemorrhage, unspecified gastrointestinal hemorrhage type [K92.2])    Surgeons: Fish Brandon MD Responsible Provider: Teetee Samaniego MD    Anesthesia Type: MAC ASA Status: 2            Anesthesia Type: No value filed. Christy Phase I: Christy Score: 10    Christy Phase II:      Anesthesia Post Evaluation    No notable events documented.

## 2023-12-22 NOTE — ED TRIAGE NOTES
TRIAGE: Pt arrives with c/o dark diarrhea that started Monday. Pt drinks 14+ liquor drinks per day. Pt has been taking prevacid for acid reflux. Denies dizziness, sob, cp. +dry cough.

## 2023-12-22 NOTE — H&P
[address] [place] [address], [address] [facility]          Pre-procedure History and Physical       NAME:  Abimael Pinedo   :   1985   MRN:   [id number]     CHIEF COMPLAINT/HPI: melena    PMH:  Past Medical History:   Diagnosis Date    Anxiety     Vitamin D deficiency        PSH:  Past Surgical History:   Procedure Laterality Date    LEEP      OTHER SURGICAL HISTORY      leep surgery, ectopic pregnancy       Allergies: Allergies   Allergen Reactions    Clarithromycin      Other reaction(s): Unknown (comments)    Bismuth Subsalicylate Nausea And Vomiting       Home Medications:  Prior to Admission Medications   Prescriptions Last Dose Informant Patient Reported? Taking?    cyanocobalamin 500 MCG tablet   Yes No   Sig: Take 500 mcg by mouth daily   ergocalciferol (ERGOCALCIFEROL) 1.25 MG (26602 UT) capsule   Yes No   Sig: TAKE 1 CAPSULE BY MOUTH EVERY 7 DAYS   folic acid (FOLVITE) 1 MG tablet   Yes No   Sig: Take 1 mg by mouth daily   ibuprofen (ADVIL;MOTRIN) 600 MG tablet   No No   Sig: Take 1 tablet by mouth every 8 hours as needed for Pain   ofloxacin (FLOXIN) 0.3 % otic solution   Yes No   Sig: Place 5 drops in ear(s) daily      Facility-Administered Medications: None       Hospital Medications:  Current Facility-Administered Medications   Medication Dose Route Frequency    ondansetron (ZOFRAN-ODT) disintegrating tablet 4 mg  4 mg Oral Q8H PRN    Or    ondansetron (ZOFRAN) injection 4 mg  4 mg IntraVENous Q6H PRN    acetaminophen (TYLENOL) tablet 650 mg  650 mg Oral Q6H PRN    Or    acetaminophen (TYLENOL) suppository 650 mg  650 mg Rectal Q6H PRN    0.9 % sodium chloride infusion   IntraVENous Continuous    pantoprazole (PROTONIX) 40 mg in sodium chloride (PF) 0.9 % 10 mL injection  40 mg IntraVENous Q12H    0.9 % sodium chloride infusion   IntraVENous Continuous    sodium chloride flush 0.9 % injection 5-40 mL  5-40 mL IntraVENous 2 times per day    sodium chloride flush 0.9 %

## 2023-12-23 LAB
APTT PPP: 31.6 SEC (ref 22.1–31)
BASOPHILS # BLD: 0.1 K/UL (ref 0–0.1)
BASOPHILS NFR BLD: 1 % (ref 0–1)
DIFFERENTIAL METHOD BLD: ABNORMAL
EOSINOPHIL # BLD: 0.1 K/UL (ref 0–0.4)
EOSINOPHIL NFR BLD: 2 % (ref 0–7)
ERYTHROCYTE [DISTWIDTH] IN BLOOD BY AUTOMATED COUNT: 13.2 % (ref 11.5–14.5)
HCT VFR BLD AUTO: 30.5 % (ref 35–47)
HCT VFR BLD AUTO: 31.5 % (ref 35–47)
HCT VFR BLD AUTO: 31.6 % (ref 35–47)
HCT VFR BLD AUTO: 32.1 % (ref 35–47)
HGB BLD-MCNC: 10 G/DL (ref 11.5–16)
HGB BLD-MCNC: 10.1 G/DL (ref 11.5–16)
HGB BLD-MCNC: 10.2 G/DL (ref 11.5–16)
HGB BLD-MCNC: 10.3 G/DL (ref 11.5–16)
IMM GRANULOCYTES # BLD AUTO: 0 K/UL (ref 0–0.04)
IMM GRANULOCYTES NFR BLD AUTO: 0 % (ref 0–0.5)
INR PPP: 1 (ref 0.9–1.1)
IRON SATN MFR SERPL: 7 % (ref 20–50)
IRON SERPL-MCNC: 17 UG/DL (ref 35–150)
LYMPHOCYTES # BLD: 1.5 K/UL (ref 0.8–3.5)
LYMPHOCYTES NFR BLD: 16 % (ref 12–49)
MCH RBC QN AUTO: 30.2 PG (ref 26–34)
MCHC RBC AUTO-ENTMCNC: 32.3 G/DL (ref 30–36.5)
MCV RBC AUTO: 93.5 FL (ref 80–99)
MONOCYTES # BLD: 0.9 K/UL (ref 0–1)
MONOCYTES NFR BLD: 10 % (ref 5–13)
NEUTS SEG # BLD: 6.9 K/UL (ref 1.8–8)
NEUTS SEG NFR BLD: 71 % (ref 32–75)
NRBC # BLD: 0 K/UL (ref 0–0.01)
NRBC BLD-RTO: 0 PER 100 WBC
PLATELET # BLD AUTO: 230 K/UL (ref 150–400)
PMV BLD AUTO: 10.3 FL (ref 8.9–12.9)
PROTHROMBIN TIME: 10.8 SEC (ref 9–11.1)
RBC # BLD AUTO: 3.38 M/UL (ref 3.8–5.2)
THERAPEUTIC RANGE: ABNORMAL SECS (ref 58–77)
TIBC SERPL-MCNC: 255 UG/DL (ref 250–450)
WBC # BLD AUTO: 9.6 K/UL (ref 3.6–11)

## 2023-12-23 PROCEDURE — 6370000000 HC RX 637 (ALT 250 FOR IP): Performed by: HOSPITALIST

## 2023-12-23 PROCEDURE — G0378 HOSPITAL OBSERVATION PER HR: HCPCS

## 2023-12-23 PROCEDURE — 83540 ASSAY OF IRON: CPT

## 2023-12-23 PROCEDURE — 85610 PROTHROMBIN TIME: CPT

## 2023-12-23 PROCEDURE — 85730 THROMBOPLASTIN TIME PARTIAL: CPT

## 2023-12-23 PROCEDURE — 96376 TX/PRO/DX INJ SAME DRUG ADON: CPT

## 2023-12-23 PROCEDURE — A4216 STERILE WATER/SALINE, 10 ML: HCPCS | Performed by: HOSPITALIST

## 2023-12-23 PROCEDURE — 36415 COLL VENOUS BLD VENIPUNCTURE: CPT

## 2023-12-23 PROCEDURE — 6360000002 HC RX W HCPCS: Performed by: HOSPITALIST

## 2023-12-23 PROCEDURE — 85025 COMPLETE CBC W/AUTO DIFF WBC: CPT

## 2023-12-23 PROCEDURE — 85014 HEMATOCRIT: CPT

## 2023-12-23 PROCEDURE — 2580000003 HC RX 258: Performed by: HOSPITALIST

## 2023-12-23 PROCEDURE — 83550 IRON BINDING TEST: CPT

## 2023-12-23 PROCEDURE — C9113 INJ PANTOPRAZOLE SODIUM, VIA: HCPCS | Performed by: HOSPITALIST

## 2023-12-23 PROCEDURE — 85018 HEMOGLOBIN: CPT

## 2023-12-23 PROCEDURE — 2580000003 HC RX 258: Performed by: INTERNAL MEDICINE

## 2023-12-23 PROCEDURE — 96361 HYDRATE IV INFUSION ADD-ON: CPT

## 2023-12-23 RX ORDER — PANTOPRAZOLE SODIUM 40 MG/1
40 TABLET, DELAYED RELEASE ORAL
Qty: 30 TABLET | Refills: 0 | Status: SHIPPED | OUTPATIENT
Start: 2023-12-23

## 2023-12-23 RX ORDER — LANOLIN ALCOHOL/MO/W.PET/CERES
100 CREAM (GRAM) TOPICAL DAILY
Qty: 30 TABLET | Refills: 1 | Status: SHIPPED | OUTPATIENT
Start: 2023-12-24

## 2023-12-23 RX ADMIN — Medication 100 MG: at 12:57

## 2023-12-23 RX ADMIN — SODIUM CHLORIDE, PRESERVATIVE FREE 10 ML: 5 INJECTION INTRAVENOUS at 13:02

## 2023-12-23 RX ADMIN — SODIUM CHLORIDE 40 MG: 9 INJECTION INTRAMUSCULAR; INTRAVENOUS; SUBCUTANEOUS at 12:57

## 2023-12-23 RX ADMIN — SODIUM CHLORIDE: 9 INJECTION, SOLUTION INTRAVENOUS at 06:22

## 2023-12-23 NOTE — DISCHARGE INSTRUCTIONS
Discharge Instructions       PATIENT ID: Enzo Giordano  MRN: [id number]   YOB: 1985    DATE OF ADMISSION: 12/22/2023   DATE OF DISCHARGE: 12/23/2023    PRIMARY CARE PROVIDER: Akanksha Huertas     ATTENDING PHYSICIAN: Devora Delgado MD   DISCHARGING PROVIDER: MODESTO Oshea NP    To contact this individual call [phone] and ask the  to page. If unavailable ask to be transferred the Adult Hospitalist Department. DISCHARGE DIAGNOSES melena    CONSULTATIONS: GI     PROCEDURES/SURGERIES: Procedure(s):  EGD ESOPHAGOGASTRODUODENOSCOPY    PENDING TEST RESULTS:   At the time of discharge the following test results are still pending: none    FOLLOW UP APPOINTMENTS:    Follow-up Information       Follow up With Specialties Details Why Kailyn Muñoz MD Internal Medicine Follow up Please call to schedule follow-up in 1 week. [address] [address] [facility]      Nora Delgadillo MD Gastroenterology Follow up Please call to schedule appointment for colonoscopy in 1 week. [facility], [address]  [phone]              ADDITIONAL CARE RECOMMENDATIONS:   Do not take any NSAIDs (ibuprofen, naproxen, aspirin, BC powder) as these can increase the risk of bleeding. If you need to take something for pain, you may take Tylenol. Continue a bland diet. If you experience any further bleeding (bloody vomit, bloody stools, or dark stools), you should return to the emergency department. You are being discharged with a prescription for pantoprazole. Take this daily as prescribed. Schedule follow-up appointments as listed above. DIET: bland diet    ACTIVITY: activity as tolerated    WOUND CARE: none    EQUIPMENT needed: none      DISCHARGE MEDICATIONS:   See Medication Reconciliation Form    It is important that you take the medication exactly as they are prescribed.    Keep your medication in the

## 2023-12-23 NOTE — DISCHARGE INSTR - DIET

## 2023-12-23 NOTE — PLAN OF CARE
Problem: Discharge Planning  Goal: Discharge to home or other facility with appropriate resources  12/23/2023 0007 by Evy Nichols RN  Outcome: Progressing  12/22/2023 1605 by Mickey Dugan LPN  Outcome: Progressing     Problem: Pain  Goal: Verbalizes/displays adequate comfort level or baseline comfort level  Outcome: Progressing

## 2023-12-23 NOTE — PROGRESS NOTES
End of Shift Note    Bedside shift change report given to [facility] RN(oncoming nurse) by Tony Lane LPN (offgoing nurse). Report included the following information SBAR, Kardex, Intake/Output, and MAR    Shift worked:  2593-1531     Shift summary and any significant changes:    New transfer   labs completed   Ultasound complete    Fluids running   Family present at bedside        Concerns for physician to address: None    Zone phone for oncoming shift:  None        Activity:     Number times ambulated in hallways past shift: 2  Number of times OOB to chair past shift: 1    Cardiac:   Cardiac Monitoring: No           Access:  Current line(s): PIV     Genitourinary:   Urinary status: voiding    Respiratory:      Chronic home O2 use?: NO  Incentive spirometer at bedside: YES       GI:     Current diet:  ADULT DIET;  Regular; GI [place] (GERD/Peptic Ulcer)  Passing flatus: YES  Tolerating current diet: YES       Pain Management:   Patient states pain is manageable on current regimen: YES    Skin:     Interventions: increase time out of bed    Patient Safety:  Fall Score:    Interventions: pt to call before getting OOB       Length of Stay:  Expected LOS: 2  Actual LOS: 0      Tony Lane LPN

## 2023-12-23 NOTE — DISCHARGE SUMMARY
Discharge Summary       PATIENT ID: Chad Carpio  MRN: [id number]   YOB: 1985    DATE OF ADMISSION: 12/22/2023  6:27 AM    DATE OF DISCHARGE: 12/23/2023   PRIMARY CARE PROVIDER: Flor Sotelo MD     ATTENDING PHYSICIAN: Dr. Anuradha Valdez  DISCHARGING PROVIDER: MODESTO Boykin NP    To contact this individual call [phone] and ask the  to page. If unavailable ask to be transferred the Adult Hospitalist Department. CONSULTATIONS: IP CONSULT TO GI  IP CONSULT TO HOSPITALIST  IP CONSULT TO GI  IP CONSULT TO SOCIAL WORK    PROCEDURES/SURGERIES: Procedure(s):  EGD ESOPHAGOGASTRODUODENOSCOPY     ADMITTING DIAGNOSES & HOSPITAL COURSE:   Per H&P: Chad Carpio is a 41 y.o. female presents with to the ED for evaluation of loose black stool that started 4-5 days ago. She denies nausea, vomiting, abdominal pain. She states some dyspepsia and was taking over-the-counter antacid. Denies history of ulcer disease. She denies regular use of NSAIDs. She endorses drinking 1-2 drinks a night and may be more over weekends but she has not been having more than a drink for the last few days. She smokes, denies drug use. No other significant medical history. Patient also had sore throat and low-grade fever. She tested negative for flu and COVID. Per ED attending, black stool on rectal exam but stool tested negative for blood. Initial lab work significant for WBC 12.3, hemoglobin 11.5, her hemoglobin was 12.8 in July 2023. She had EGD on 12/22, which was unremarkable. Her hemoglobin remained stable and she had no further melena, so was medically stable for discharge home on 12/23.     /87, HR 92 at time of discharge    [place] / PLAN:      Melena with loose stool -- resolved  - seen by GI, will need follow-up outpatient for possible colonoscopy  - EGD (12/22): normal  - continue pantoprazole -- discharged with prescription  - tolerating GI bland diet  - Hgb stable

## 2024-03-26 ENCOUNTER — HOSPITAL ENCOUNTER (EMERGENCY)
Facility: HOSPITAL | Age: 39
Discharge: HOME | End: 2024-03-26
Attending: EMERGENCY MEDICINE

## 2024-03-26 VITALS
DIASTOLIC BLOOD PRESSURE: 98 MMHG | RESPIRATION RATE: 20 BRPM | BODY MASS INDEX: 28.35 KG/M2 | TEMPERATURE: 97.5 F | SYSTOLIC BLOOD PRESSURE: 131 MMHG | HEART RATE: 97 BPM | WEIGHT: 154.98 LBS | OXYGEN SATURATION: 100 %

## 2024-03-26 DIAGNOSIS — R42 LIGHTHEADEDNESS: Primary | ICD-10-CM

## 2024-03-26 LAB
ALBUMIN SERPL-MCNC: 3.8 G/DL (ref 3.5–5)
ALBUMIN/GLOB SERPL: 0.9 (ref 1.1–2.2)
ALP SERPL-CCNC: 100 U/L (ref 45–117)
ALT SERPL-CCNC: 19 U/L (ref 12–78)
ANION GAP SERPL CALC-SCNC: 5 MMOL/L (ref 5–15)
AST SERPL-CCNC: 14 U/L (ref 15–37)
BASOPHILS # BLD: 0.1 K/UL (ref 0–0.1)
BASOPHILS NFR BLD: 1 % (ref 0–1)
BILIRUB SERPL-MCNC: 0.7 MG/DL (ref 0.2–1)
BUN SERPL-MCNC: 9 MG/DL (ref 6–20)
BUN/CREAT SERPL: 10 (ref 12–20)
CALCIUM SERPL-MCNC: 9.4 MG/DL (ref 8.5–10.1)
CHLORIDE SERPL-SCNC: 108 MMOL/L (ref 97–108)
CO2 SERPL-SCNC: 25 MMOL/L (ref 21–32)
COMMENT:: NORMAL
CREAT SERPL-MCNC: 0.88 MG/DL (ref 0.55–1.02)
DIFFERENTIAL METHOD BLD: NORMAL
EKG ATRIAL RATE: 83 BPM
EKG DIAGNOSIS: NORMAL
EKG P AXIS: 52 DEGREES
EKG P-R INTERVAL: 136 MS
EKG Q-T INTERVAL: 362 MS
EKG QRS DURATION: 76 MS
EKG QTC CALCULATION (BAZETT): 425 MS
EKG R AXIS: 51 DEGREES
EKG T AXIS: 48 DEGREES
EKG VENTRICULAR RATE: 83 BPM
EOSINOPHIL # BLD: 0.2 K/UL (ref 0–0.4)
EOSINOPHIL NFR BLD: 3 % (ref 0–7)
ERYTHROCYTE [DISTWIDTH] IN BLOOD BY AUTOMATED COUNT: 14 % (ref 11.5–14.5)
GLOBULIN SER CALC-MCNC: 4.3 G/DL (ref 2–4)
GLUCOSE SERPL-MCNC: 114 MG/DL (ref 65–100)
HCT VFR BLD AUTO: 38.5 % (ref 35–47)
HGB BLD-MCNC: 12.7 G/DL (ref 11.5–16)
IMM GRANULOCYTES # BLD AUTO: 0 K/UL (ref 0–0.04)
IMM GRANULOCYTES NFR BLD AUTO: 0 % (ref 0–0.5)
LYMPHOCYTES # BLD: 3 K/UL (ref 0.8–3.5)
LYMPHOCYTES NFR BLD: 32 % (ref 12–49)
MCH RBC QN AUTO: 30.2 PG (ref 26–34)
MCHC RBC AUTO-ENTMCNC: 33 G/DL (ref 30–36.5)
MCV RBC AUTO: 91.7 FL (ref 80–99)
MONOCYTES # BLD: 0.6 K/UL (ref 0–1)
MONOCYTES NFR BLD: 6 % (ref 5–13)
NEUTS SEG # BLD: 5.5 K/UL (ref 1.8–8)
NEUTS SEG NFR BLD: 58 % (ref 32–75)
NRBC # BLD: 0 K/UL (ref 0–0.01)
NRBC BLD-RTO: 0 PER 100 WBC
PLATELET # BLD AUTO: 341 K/UL (ref 150–400)
PMV BLD AUTO: 9.5 FL (ref 8.9–12.9)
POTASSIUM SERPL-SCNC: 3.5 MMOL/L (ref 3.5–5.1)
PROT SERPL-MCNC: 8.1 G/DL (ref 6.4–8.2)
RBC # BLD AUTO: 4.2 M/UL (ref 3.8–5.2)
SODIUM SERPL-SCNC: 138 MMOL/L (ref 136–145)
SPECIMEN HOLD: NORMAL
WBC # BLD AUTO: 9.4 K/UL (ref 3.6–11)

## 2024-03-26 PROCEDURE — 93010 ELECTROCARDIOGRAM REPORT: CPT | Performed by: SPECIALIST

## 2024-03-26 PROCEDURE — 80053 COMPREHEN METABOLIC PANEL: CPT

## 2024-03-26 PROCEDURE — 36415 COLL VENOUS BLD VENIPUNCTURE: CPT

## 2024-03-26 PROCEDURE — 85025 COMPLETE CBC W/AUTO DIFF WBC: CPT

## 2024-03-26 PROCEDURE — 99284 EMERGENCY DEPT VISIT MOD MDM: CPT

## 2024-03-26 PROCEDURE — 93005 ELECTROCARDIOGRAM TRACING: CPT | Performed by: EMERGENCY MEDICINE

## 2024-03-26 ASSESSMENT — ENCOUNTER SYMPTOMS
RESPIRATORY NEGATIVE: 1
GASTROINTESTINAL NEGATIVE: 1
EYES NEGATIVE: 1

## 2024-03-26 ASSESSMENT — PAIN - FUNCTIONAL ASSESSMENT: PAIN_FUNCTIONAL_ASSESSMENT: NONE - DENIES PAIN

## 2024-03-26 NOTE — ED PROVIDER NOTES
[facility] EMERGENCY DEP  EMERGENCY DEPARTMENT ENCOUNTER      Pt Name: Barb Vogt  MRN: [id number]  Birthdate 1985  Date of evaluation: 3/26/2024  Provider: hCalo Fairbanks MD    CHIEF COMPLAINT       Chief Complaint   Patient presents with    Dizziness         HISTORY OF PRESENT ILLNESS   (Location/Symptom, Timing/Onset, Context/Setting, Quality, Duration, Modifying Factors, Severity)  Note limiting factors.   39-year-old female with PMHx of anxiety presents from home with complaints of \"unstable feeling\" since 2021. Patient reports she gets an unstable feeling between her eyes and then feels like she might fall down. She reports the intensity and frequency of these episodes has increased in the last week.  She has no additional complaints at this time        The history is provided by the patient.         Review of External Medical Records:     Nursing Notes were reviewed.    REVIEW OF SYSTEMS    (2-9 systems for level 4, 10 or more for level 5)     Review of Systems   Constitutional: Negative.    HENT: Negative.     Eyes: Negative.    Respiratory: Negative.     Cardiovascular: Negative.    Gastrointestinal: Negative.    Genitourinary: Negative.    Musculoskeletal: Negative.    Skin: Negative.    Neurological:  Positive for dizziness.   Psychiatric/Behavioral: Negative.         Except as noted above the remainder of the review of systems was reviewed and negative.       PAST MEDICAL HISTORY     Past Medical History:   Diagnosis Date    Anxiety     Vitamin D deficiency          SURGICAL HISTORY       Past Surgical History:   Procedure Laterality Date    LEEP      OTHER SURGICAL HISTORY      leep surgery, ectopic pregnancy    UPPER GASTROINTESTINAL ENDOSCOPY N/A 12/22/2023    EGD ESOPHAGOGASTRODUODENOSCOPY performed by Mustapha Webster MD at [facility] ENDOSCOPY         CURRENT MEDICATIONS       Discharge Medication List as of 3/26/2024  1:02 PM        CONTINUE these medications which have NOT CHANGED    Details

## 2024-03-26 NOTE — ED TRIAGE NOTES
Patient presents from home with complaints of \"unstable feeling\" since 2021. Patient reports she gets an unstable feeling between her eyes and then feels like she might fall down. She reports the feeling has increased and she experiences it more often

## 2024-03-26 NOTE — DISCHARGE INSTRUCTIONS
You were seen in the emergency department for dizziness.  The results of your tests were reassuring.  Although an exact cause of your symptoms was not identified, the most likely cause is orthostatic hypotension versus vasovagal syncope.  Please take any medications prescribed at this visit as instructed.  Please follow-up with your PCP or return to the emergency department if you experience a worsening of symptoms or any new symptoms that are concerning to you.

## 2024-09-18 ENCOUNTER — APPOINTMENT (OUTPATIENT)
Facility: HOSPITAL | Age: 39
End: 2024-09-18
Payer: COMMERCIAL

## 2024-09-18 ENCOUNTER — HOSPITAL ENCOUNTER (EMERGENCY)
Facility: HOSPITAL | Age: 39
Discharge: HOME | End: 2024-09-18
Attending: EMERGENCY MEDICINE
Payer: COMMERCIAL

## 2024-09-18 VITALS
BODY MASS INDEX: 27.95 KG/M2 | RESPIRATION RATE: 18 BRPM | SYSTOLIC BLOOD PRESSURE: 131 MMHG | WEIGHT: 151.9 LBS | HEIGHT: 62 IN | DIASTOLIC BLOOD PRESSURE: 77 MMHG | TEMPERATURE: 98.9 F | HEART RATE: 66 BPM | OXYGEN SATURATION: 97 %

## 2024-09-18 DIAGNOSIS — R42 LIGHTHEADEDNESS: Primary | ICD-10-CM

## 2024-09-18 LAB
ALBUMIN SERPL-MCNC: 4.5 G/DL (ref 3.5–5)
ALBUMIN/GLOB SERPL: 1.2 (ref 1.1–2.2)
ALP SERPL-CCNC: 99 U/L (ref 45–117)
ALT SERPL-CCNC: 18 U/L (ref 12–78)
ANION GAP SERPL CALC-SCNC: 1 MMOL/L (ref 2–12)
AST SERPL-CCNC: 18 U/L (ref 15–37)
BASOPHILS # BLD: 0.1 K/UL (ref 0–0.1)
BASOPHILS NFR BLD: 0 % (ref 0–1)
BILIRUB SERPL-MCNC: 0.7 MG/DL (ref 0.2–1)
BUN SERPL-MCNC: 10 MG/DL (ref 6–20)
BUN/CREAT SERPL: 13 (ref 12–20)
CALCIUM SERPL-MCNC: 10 MG/DL (ref 8.5–10.1)
CHLORIDE SERPL-SCNC: 105 MMOL/L (ref 97–108)
CO2 SERPL-SCNC: 29 MMOL/L (ref 21–32)
COMMENT:: NORMAL
CREAT SERPL-MCNC: 0.77 MG/DL (ref 0.55–1.02)
DIFFERENTIAL METHOD BLD: ABNORMAL
EKG ATRIAL RATE: 69 BPM
EKG DIAGNOSIS: NORMAL
EKG P AXIS: 54 DEGREES
EKG P-R INTERVAL: 126 MS
EKG Q-T INTERVAL: 406 MS
EKG QRS DURATION: 88 MS
EKG QTC CALCULATION (BAZETT): 435 MS
EKG R AXIS: 47 DEGREES
EKG T AXIS: 47 DEGREES
EKG VENTRICULAR RATE: 69 BPM
EOSINOPHIL # BLD: 0.1 K/UL (ref 0–0.4)
EOSINOPHIL NFR BLD: 1 % (ref 0–7)
ERYTHROCYTE [DISTWIDTH] IN BLOOD BY AUTOMATED COUNT: 13.4 % (ref 11.5–14.5)
GLOBULIN SER CALC-MCNC: 3.7 G/DL (ref 2–4)
GLUCOSE SERPL-MCNC: 106 MG/DL (ref 65–100)
HCG UR QL: NEGATIVE
HCG, URINE, POC: NEGATIVE
HCT VFR BLD AUTO: 39.1 % (ref 35–47)
HGB BLD-MCNC: 12.5 G/DL (ref 11.5–16)
IMM GRANULOCYTES # BLD AUTO: 0 K/UL (ref 0–0.04)
IMM GRANULOCYTES NFR BLD AUTO: 0 % (ref 0–0.5)
LYMPHOCYTES # BLD: 3.2 K/UL (ref 0.8–3.5)
LYMPHOCYTES NFR BLD: 27 % (ref 12–49)
Lab: NORMAL
MAGNESIUM SERPL-MCNC: 2.1 MG/DL (ref 1.6–2.4)
MCH RBC QN AUTO: 29.9 PG (ref 26–34)
MCHC RBC AUTO-ENTMCNC: 32 G/DL (ref 30–36.5)
MCV RBC AUTO: 93.5 FL (ref 80–99)
MONOCYTES # BLD: 0.7 K/UL (ref 0–1)
MONOCYTES NFR BLD: 6 % (ref 5–13)
NEGATIVE QC PASS/FAIL: NORMAL
NEUTS SEG # BLD: 7.8 K/UL (ref 1.8–8)
NEUTS SEG NFR BLD: 66 % (ref 32–75)
NRBC # BLD: 0 K/UL (ref 0–0.01)
NRBC BLD-RTO: 0 PER 100 WBC
PLATELET # BLD AUTO: 331 K/UL (ref 150–400)
PMV BLD AUTO: 9.9 FL (ref 8.9–12.9)
POSITIVE QC PASS/FAIL: NORMAL
POTASSIUM SERPL-SCNC: 4.3 MMOL/L (ref 3.5–5.1)
PROT SERPL-MCNC: 8.2 G/DL (ref 6.4–8.2)
RBC # BLD AUTO: 4.18 M/UL (ref 3.8–5.2)
SODIUM SERPL-SCNC: 135 MMOL/L (ref 136–145)
SPECIMEN HOLD: NORMAL
TROPONIN I SERPL HS-MCNC: <4 NG/L (ref 0–51)
WBC # BLD AUTO: 12 K/UL (ref 3.6–11)

## 2024-09-18 PROCEDURE — 80053 COMPREHEN METABOLIC PANEL: CPT

## 2024-09-18 PROCEDURE — 93005 ELECTROCARDIOGRAM TRACING: CPT

## 2024-09-18 PROCEDURE — 99285 EMERGENCY DEPT VISIT HI MDM: CPT

## 2024-09-18 PROCEDURE — 96360 HYDRATION IV INFUSION INIT: CPT

## 2024-09-18 PROCEDURE — 70450 CT HEAD/BRAIN W/O DYE: CPT

## 2024-09-18 PROCEDURE — 71046 X-RAY EXAM CHEST 2 VIEWS: CPT

## 2024-09-18 PROCEDURE — 81025 URINE PREGNANCY TEST: CPT

## 2024-09-18 PROCEDURE — 83735 ASSAY OF MAGNESIUM: CPT

## 2024-09-18 PROCEDURE — 84484 ASSAY OF TROPONIN QUANT: CPT

## 2024-09-18 PROCEDURE — 36415 COLL VENOUS BLD VENIPUNCTURE: CPT

## 2024-09-18 PROCEDURE — 2580000003 HC RX 258

## 2024-09-18 PROCEDURE — 85025 COMPLETE CBC W/AUTO DIFF WBC: CPT

## 2024-09-18 RX ORDER — HYDROXYZINE HYDROCHLORIDE 25 MG/1
25 TABLET, FILM COATED ORAL EVERY 8 HOURS PRN
Qty: 30 TABLET | Refills: 0 | Status: SHIPPED | OUTPATIENT
Start: 2024-09-18 | End: 2024-09-28

## 2024-09-18 RX ORDER — 0.9 % SODIUM CHLORIDE 0.9 %
1000 INTRAVENOUS SOLUTION INTRAVENOUS ONCE
Status: COMPLETED | OUTPATIENT
Start: 2024-09-18 | End: 2024-09-18

## 2024-09-18 RX ADMIN — SODIUM CHLORIDE 1000 ML: 9 INJECTION, SOLUTION INTRAVENOUS at 12:54

## 2024-09-18 ASSESSMENT — PAIN DESCRIPTION - ORIENTATION
ORIENTATION: OTHER (COMMENT)
ORIENTATION: RIGHT;LEFT

## 2024-09-18 ASSESSMENT — PAIN DESCRIPTION - PAIN TYPE
TYPE: ACUTE PAIN
TYPE: ACUTE PAIN

## 2024-09-18 ASSESSMENT — PAIN - FUNCTIONAL ASSESSMENT
PAIN_FUNCTIONAL_ASSESSMENT: 0-10
PAIN_FUNCTIONAL_ASSESSMENT: ACTIVITIES ARE NOT PREVENTED
PAIN_FUNCTIONAL_ASSESSMENT: 0-10
PAIN_FUNCTIONAL_ASSESSMENT: ACTIVITIES ARE NOT PREVENTED

## 2024-09-18 ASSESSMENT — PAIN DESCRIPTION - FREQUENCY
FREQUENCY: INTERMITTENT
FREQUENCY: CONTINUOUS

## 2024-09-18 ASSESSMENT — PAIN DESCRIPTION - DESCRIPTORS
DESCRIPTORS: ACHING;PRESSURE;THROBBING
DESCRIPTORS: ACHING;DISCOMFORT

## 2024-09-18 ASSESSMENT — PAIN SCALES - GENERAL
PAINLEVEL_OUTOF10: 5
PAINLEVEL_OUTOF10: 2

## 2024-09-18 ASSESSMENT — PAIN DESCRIPTION - ONSET
ONSET: ON-GOING
ONSET: ON-GOING

## 2024-09-18 ASSESSMENT — PAIN DESCRIPTION - LOCATION
LOCATION: HEAD
LOCATION: HEAD